# Patient Record
Sex: FEMALE | Race: WHITE | NOT HISPANIC OR LATINO | Employment: OTHER | URBAN - METROPOLITAN AREA
[De-identification: names, ages, dates, MRNs, and addresses within clinical notes are randomized per-mention and may not be internally consistent; named-entity substitution may affect disease eponyms.]

---

## 2017-05-15 ENCOUNTER — HISTORICAL (OUTPATIENT)
Dept: INFUSION THERAPY | Facility: HOSPITAL | Age: 67
End: 2017-05-15

## 2017-09-18 ENCOUNTER — HISTORICAL (OUTPATIENT)
Dept: RADIOLOGY | Facility: HOSPITAL | Age: 67
End: 2017-09-18

## 2017-11-16 ENCOUNTER — HISTORICAL (OUTPATIENT)
Dept: INFUSION THERAPY | Facility: HOSPITAL | Age: 67
End: 2017-11-16

## 2018-01-05 ENCOUNTER — HISTORICAL (OUTPATIENT)
Dept: LAB | Facility: HOSPITAL | Age: 68
End: 2018-01-05

## 2018-05-24 ENCOUNTER — HISTORICAL (OUTPATIENT)
Dept: INFUSION THERAPY | Facility: HOSPITAL | Age: 68
End: 2018-05-24

## 2018-12-03 ENCOUNTER — HISTORICAL (OUTPATIENT)
Dept: INFUSION THERAPY | Facility: HOSPITAL | Age: 68
End: 2018-12-03

## 2019-01-31 ENCOUNTER — HISTORICAL (OUTPATIENT)
Dept: LAB | Facility: HOSPITAL | Age: 69
End: 2019-01-31

## 2019-06-19 ENCOUNTER — HISTORICAL (OUTPATIENT)
Dept: ADMINISTRATIVE | Facility: HOSPITAL | Age: 69
End: 2019-06-19

## 2019-06-19 LAB
ABS NEUT (OLG): 5.13 X10(3)/MCL (ref 2.1–9.2)
ALBUMIN SERPL-MCNC: 3.3 GM/DL (ref 3.4–5)
ALBUMIN/GLOB SERPL: 1 RATIO (ref 1.1–2)
ALP SERPL-CCNC: 70 UNIT/L (ref 38–126)
ALT SERPL-CCNC: 14 UNIT/L (ref 12–78)
AST SERPL-CCNC: 8 UNIT/L (ref 15–37)
BASOPHILS # BLD AUTO: 0 X10(3)/MCL (ref 0–0.2)
BASOPHILS NFR BLD AUTO: 0.4 %
BILIRUB SERPL-MCNC: 0.3 MG/DL (ref 0.2–1)
BILIRUBIN DIRECT+TOT PNL SERPL-MCNC: 0.1 MG/DL (ref 0–0.5)
BILIRUBIN DIRECT+TOT PNL SERPL-MCNC: 0.2 MG/DL (ref 0–0.8)
BUN SERPL-MCNC: 13 MG/DL (ref 7–18)
CALCIUM SERPL-MCNC: 9.5 MG/DL (ref 8.5–10.1)
CHLORIDE SERPL-SCNC: 105 MMOL/L (ref 98–107)
CO2 SERPL-SCNC: 29 MMOL/L (ref 21–32)
CREAT SERPL-MCNC: 0.83 MG/DL (ref 0.55–1.02)
EOSINOPHIL # BLD AUTO: 0.1 X10(3)/MCL (ref 0–0.9)
EOSINOPHIL NFR BLD AUTO: 1.3 %
ERYTHROCYTE [DISTWIDTH] IN BLOOD BY AUTOMATED COUNT: 14.5 % (ref 11.5–17)
FERRITIN SERPL-MCNC: 3.9 NG/ML (ref 8–388)
GLOBULIN SER-MCNC: 3.2 GM/DL (ref 2.4–3.5)
GLUCOSE SERPL-MCNC: 173 MG/DL (ref 74–106)
HCT VFR BLD AUTO: 34 % (ref 37–47)
HGB BLD-MCNC: 10 GM/DL (ref 12–16)
IRON SATN MFR SERPL: 8.6 % (ref 20–50)
IRON SERPL-MCNC: 36 MCG/DL (ref 50–175)
LYMPHOCYTES # BLD AUTO: 1.3 X10(3)/MCL (ref 0.6–4.6)
LYMPHOCYTES NFR BLD AUTO: 19 %
MCH RBC QN AUTO: 27.4 PG (ref 27–31)
MCHC RBC AUTO-ENTMCNC: 29.4 GM/DL (ref 33–36)
MCV RBC AUTO: 93.2 FL (ref 80–94)
MONOCYTES # BLD AUTO: 0.4 X10(3)/MCL (ref 0.1–1.3)
MONOCYTES NFR BLD AUTO: 5.1 %
NEUTROPHILS # BLD AUTO: 5.1 X10(3)/MCL (ref 2.1–9.2)
NEUTROPHILS NFR BLD AUTO: 74.2 %
PLATELET # BLD AUTO: 336 X10(3)/MCL (ref 130–400)
PMV BLD AUTO: 9.5 FL (ref 9.4–12.4)
POTASSIUM SERPL-SCNC: 3.9 MMOL/L (ref 3.5–5.1)
PROT SERPL-MCNC: 6.5 GM/DL (ref 6.4–8.2)
RBC # BLD AUTO: 3.65 X10(6)/MCL (ref 4.2–5.4)
SODIUM SERPL-SCNC: 140 MMOL/L (ref 136–145)
TIBC SERPL-MCNC: 421 MCG/DL (ref 250–450)
TRANSFERRIN SERPL-MCNC: 330 MG/DL (ref 200–360)
VIT B12 SERPL-MCNC: 1256 PG/ML (ref 193–986)
WBC # SPEC AUTO: 6.9 X10(3)/MCL (ref 4.5–11.5)

## 2019-06-25 ENCOUNTER — HISTORICAL (OUTPATIENT)
Dept: INFUSION THERAPY | Facility: HOSPITAL | Age: 69
End: 2019-06-25

## 2019-09-19 ENCOUNTER — HISTORICAL (OUTPATIENT)
Dept: RADIOLOGY | Facility: HOSPITAL | Age: 69
End: 2019-09-19

## 2019-10-10 ENCOUNTER — HISTORICAL (OUTPATIENT)
Dept: LAB | Facility: HOSPITAL | Age: 69
End: 2019-10-10

## 2019-10-10 LAB
ABS NEUT (OLG): 4.2 X10(3)/MCL (ref 2.1–9.2)
ALBUMIN SERPL-MCNC: 3.4 GM/DL (ref 3.4–5)
ALBUMIN/GLOB SERPL: 1 RATIO (ref 1.1–2)
ALP SERPL-CCNC: 56 UNIT/L (ref 38–126)
ALT SERPL-CCNC: 17 UNIT/L (ref 12–78)
AST SERPL-CCNC: 10 UNIT/L (ref 15–37)
BASOPHILS # BLD AUTO: 0 X10(3)/MCL (ref 0–0.2)
BASOPHILS NFR BLD AUTO: 1 %
BILIRUB SERPL-MCNC: 0.2 MG/DL (ref 0.2–1)
BILIRUBIN DIRECT+TOT PNL SERPL-MCNC: 0.1 MG/DL (ref 0–0.5)
BILIRUBIN DIRECT+TOT PNL SERPL-MCNC: 0.1 MG/DL (ref 0–0.8)
BUN SERPL-MCNC: 16 MG/DL (ref 7–18)
CALCIUM SERPL-MCNC: 9.6 MG/DL (ref 8.5–10.1)
CHLORIDE SERPL-SCNC: 106 MMOL/L (ref 98–107)
CHOLEST SERPL-MCNC: 210 MG/DL (ref 0–200)
CHOLEST/HDLC SERPL: 2.8 {RATIO} (ref 0–4)
CO2 SERPL-SCNC: 28 MMOL/L (ref 21–32)
CREAT SERPL-MCNC: 0.87 MG/DL (ref 0.55–1.02)
EOSINOPHIL # BLD AUTO: 0.1 X10(3)/MCL (ref 0–0.9)
EOSINOPHIL NFR BLD AUTO: 1 %
ERYTHROCYTE [DISTWIDTH] IN BLOOD BY AUTOMATED COUNT: 16.5 % (ref 11.5–17)
EST. AVERAGE GLUCOSE BLD GHB EST-MCNC: 146 MG/DL
GLOBULIN SER-MCNC: 3.4 GM/DL (ref 2.4–3.5)
GLUCOSE SERPL-MCNC: 140 MG/DL (ref 74–106)
HBA1C MFR BLD: 6.7 % (ref 4.2–6.3)
HCT VFR BLD AUTO: 36.9 % (ref 37–47)
HDLC SERPL-MCNC: 76 MG/DL (ref 35–60)
HGB BLD-MCNC: 10.8 GM/DL (ref 12–16)
LDLC SERPL CALC-MCNC: 109 MG/DL (ref 0–129)
LYMPHOCYTES # BLD AUTO: 1.4 X10(3)/MCL (ref 0.6–4.6)
LYMPHOCYTES NFR BLD AUTO: 23 %
MCH RBC QN AUTO: 27.2 PG (ref 27–31)
MCHC RBC AUTO-ENTMCNC: 29.3 GM/DL (ref 33–36)
MCV RBC AUTO: 92.9 FL (ref 80–94)
MONOCYTES # BLD AUTO: 0.4 X10(3)/MCL (ref 0.1–1.3)
MONOCYTES NFR BLD AUTO: 6 %
NEUTROPHILS # BLD AUTO: 4.2 X10(3)/MCL (ref 2.1–9.2)
NEUTROPHILS NFR BLD AUTO: 69 %
PLATELET # BLD AUTO: 364 X10(3)/MCL (ref 130–400)
PMV BLD AUTO: 10.4 FL (ref 9.4–12.4)
POTASSIUM SERPL-SCNC: 4.2 MMOL/L (ref 3.5–5.1)
PROT SERPL-MCNC: 6.8 GM/DL (ref 6.4–8.2)
RBC # BLD AUTO: 3.97 X10(6)/MCL (ref 4.2–5.4)
SODIUM SERPL-SCNC: 142 MMOL/L (ref 136–145)
TRIGL SERPL-MCNC: 127 MG/DL (ref 30–150)
VLDLC SERPL CALC-MCNC: 25 MG/DL
WBC # SPEC AUTO: 6.1 X10(3)/MCL (ref 4.5–11.5)

## 2019-12-30 ENCOUNTER — HISTORICAL (OUTPATIENT)
Dept: INFUSION THERAPY | Facility: HOSPITAL | Age: 69
End: 2019-12-30

## 2020-06-17 LAB — CRC RECOMMENDATION EXT: NORMAL

## 2021-01-11 ENCOUNTER — HISTORICAL (OUTPATIENT)
Dept: HEMATOLOGY/ONCOLOGY | Facility: CLINIC | Age: 71
End: 2021-01-11

## 2021-04-09 ENCOUNTER — HISTORICAL (OUTPATIENT)
Dept: ADMINISTRATIVE | Facility: HOSPITAL | Age: 71
End: 2021-04-09

## 2021-04-09 LAB
ABS NEUT (OLG): 3.01 X10(3)/MCL (ref 2.1–9.2)
ALBUMIN SERPL-MCNC: 3.7 GM/DL (ref 3.4–4.8)
ALBUMIN/GLOB SERPL: 1.4 RATIO (ref 1.1–2)
ALP SERPL-CCNC: 74 UNIT/L (ref 40–150)
ALT SERPL-CCNC: 13 UNIT/L (ref 0–55)
APPEARANCE, UA: CLEAR
AST SERPL-CCNC: 13 UNIT/L (ref 5–34)
BACTERIA SPEC CULT: ABNORMAL /HPF
BASOPHILS # BLD AUTO: 0 X10(3)/MCL (ref 0–0.2)
BASOPHILS NFR BLD AUTO: 1 %
BILIRUB SERPL-MCNC: 0.5 MG/DL
BILIRUB UR QL STRIP: NEGATIVE
BILIRUBIN DIRECT+TOT PNL SERPL-MCNC: 0.2 MG/DL (ref 0–0.5)
BILIRUBIN DIRECT+TOT PNL SERPL-MCNC: 0.3 MG/DL (ref 0–0.8)
BUN SERPL-MCNC: 16.3 MG/DL (ref 9.8–20.1)
CALCIUM SERPL-MCNC: 11.6 MG/DL (ref 8.4–10.2)
CHLORIDE SERPL-SCNC: 105 MMOL/L (ref 98–107)
CO2 SERPL-SCNC: 29 MMOL/L (ref 23–31)
COLOR UR: YELLOW
CREAT SERPL-MCNC: 0.89 MG/DL (ref 0.55–1.02)
EOSINOPHIL # BLD AUTO: 0.1 X10(3)/MCL (ref 0–0.9)
EOSINOPHIL NFR BLD AUTO: 2 %
ERYTHROCYTE [DISTWIDTH] IN BLOOD BY AUTOMATED COUNT: 13.1 % (ref 11.5–17)
EST. AVERAGE GLUCOSE BLD GHB EST-MCNC: 151.3 MG/DL
GLOBULIN SER-MCNC: 2.6 GM/DL (ref 2.4–3.5)
GLUCOSE (UA): NEGATIVE
GLUCOSE SERPL-MCNC: 125 MG/DL (ref 82–115)
HBA1C MFR BLD: 6.9 %
HCT VFR BLD AUTO: 40.8 % (ref 37–47)
HGB BLD-MCNC: 12.5 GM/DL (ref 12–16)
HGB UR QL STRIP: NEGATIVE
KETONES UR QL STRIP: NEGATIVE
LEUKOCYTE ESTERASE UR QL STRIP: ABNORMAL
LYMPHOCYTES # BLD AUTO: 1.5 X10(3)/MCL (ref 0.6–4.6)
LYMPHOCYTES NFR BLD AUTO: 30 %
MCH RBC QN AUTO: 31.7 PG (ref 27–31)
MCHC RBC AUTO-ENTMCNC: 30.6 GM/DL (ref 33–36)
MCV RBC AUTO: 103.6 FL (ref 80–94)
MONOCYTES # BLD AUTO: 0.4 X10(3)/MCL (ref 0.1–1.3)
MONOCYTES NFR BLD AUTO: 8 %
NEUTROPHILS # BLD AUTO: 3.01 X10(3)/MCL (ref 2.1–9.2)
NEUTROPHILS NFR BLD AUTO: 60 %
NITRITE UR QL STRIP: NEGATIVE
PH UR STRIP: 5 [PH] (ref 5–9)
PLATELET # BLD AUTO: 349 X10(3)/MCL (ref 130–400)
PMV BLD AUTO: 10.8 FL (ref 9.4–12.4)
POTASSIUM SERPL-SCNC: 4.4 MMOL/L (ref 3.5–5.1)
PROT SERPL-MCNC: 6.3 GM/DL (ref 5.8–7.6)
PROT UR QL STRIP: NEGATIVE
RBC # BLD AUTO: 3.94 X10(6)/MCL (ref 4.2–5.4)
RBC #/AREA URNS HPF: ABNORMAL /[HPF]
SODIUM SERPL-SCNC: 142 MMOL/L (ref 136–145)
SP GR UR STRIP: 1.03 (ref 1–1.03)
SQUAMOUS EPITHELIAL, UA: ABNORMAL /HPF (ref 0–4)
UROBILINOGEN UR STRIP-ACNC: 0.2
WBC # SPEC AUTO: 5.1 X10(3)/MCL (ref 4.5–11.5)
WBC #/AREA URNS HPF: ABNORMAL /[HPF]

## 2022-04-07 ENCOUNTER — HISTORICAL (OUTPATIENT)
Dept: ADMINISTRATIVE | Facility: HOSPITAL | Age: 72
End: 2022-04-07
Payer: MEDICARE

## 2022-04-24 VITALS
BODY MASS INDEX: 24.37 KG/M2 | HEIGHT: 65 IN | SYSTOLIC BLOOD PRESSURE: 122 MMHG | DIASTOLIC BLOOD PRESSURE: 80 MMHG | OXYGEN SATURATION: 96 % | WEIGHT: 146.25 LBS

## 2022-04-30 NOTE — PROGRESS NOTES
Patient:   Filomena Tam             MRN: [id number]            FIN: [id number]               Age:   68 years     Sex:  Female     :  1950   Associated Diagnoses:   Ductal carcinoma in situ (DCIS) of right breast; Aromatase inhibitor use; Osteopenia; Atypical ductal hyperplasia, breast   Author:   Ruthie Dominguez NP      Visit Information   Diagnosis:     1) Right DCIS, ()--->    -DCIS Recurrence ()  2) Atypical ductal hyperplasia  3) Intraductal papilloma ()    Present Treatment:   Arimidex 1 mg daily on 2015  Prolia (initiated 10/2015)    Treatment history:      Lumpectomy (9/21/10) --> XRT (Mammosite) --> Tamoxifen (-2014)-->Recurrence while on Tamoxifen--> Lumpectomy--> + margins---> re excision-->whole breast XRT--> Femara-->Arimidex    Mrs TAM, she is a  68 years old female that back in 2009 she had a normal mammogram that showed a focal asymmetry within the retroareolar region of the left breast. She was called for additional views that demonstrated multiple circular masses which contain lucency within them associated with pseudocapsule. Suspected  hamartomatous changes (benign). There were concerned that dose nodules could be fibroadenoma versus phyllodes tumor. She had an ultrasound-guided, large core, vacuum assisted needle biopsy of the left breast nodules. Pathology report dated 09 showed benign breast parenchyma with cysts, dilated ducts, ductal chronic inflammation, apocrine metaplasia, stromal fibrosis. The second biopsy showed focal moderate to florid ductal hyperplasia without atypia. A 6 month follow up mammography and ultrasound of the left breast was suggested to assure stability of these findings. She had this radiologic studies performed in , mammogram showed  post biopsy changes on the left with otherwise no significant changes and ultrasound showed no masses or fluid collection. She had her mammogram in July of this year  and the left breast showed benign appearing nodules and post biopsy change is. Right breast showed an enlarging nodule in the retroareolar area that may short 9 mm. It was not associated with calcification and a true lateral view Spot compression views and ultrasound were recommended. The spot compression and detailed images demonstrated that the nodule where real and that the margins were lobulated and irregular. The nodule was suspicious for malignancy at that point. Ultrasound of the right breast performed 8/10/10 confirmed the suspicious nodule, 3 cm from the nipple, with greatest dimension of 9 mm. She had a stereotactic vacuum assisted biopsy of the right breast on 8/26/10 that showed atypical ductal hyperplasia. Microcalcifications were present and associated with benign parenchyma. Cystic duct with papillary apocrine metaplasia. No invasive carcinoma identified. She had mammography guided wire localization of the right breast and lumpectomy on 9/21/10. Pathology report came back as ductal carcinoma in situ, solitary focus, 2 mm, solid type. Intermediate nuclear grade, deep lateral margins is 0.1 mm from the neoplasm. Ductal hyperplasia with atypia, multiple foci, clear margins. Sclerosing adenosis. Tissue studies showed ER negative, NH 1%. She had a bilateral breast MRI performed 10/4/10 and these showed bilateral breast cysts. No direct evidence of malignancy at that time. She had re excision on 11/2/10 of the right breast tissue and left breast tissue excision does showed focal intraductal papilloma. And she was referred for further evaluation and treatment recommendations.    She was started on Tamoxifen despite ER negative, but she was progesterone positive, mainly because of her extensive ADH as chemoprevention. She had XRT after her lumpectomy in [facility], [facility].      She has another breast imaging in June of this 2014 and it was abnormal. She has a right lumpectomy on 7/1/14 but margins were  involved with intraductal carcinoma. Dr Cates went back again 7/18/14 and she had clear margins and no residual disease. This time she was ER/VT+. She has some thickening of uterus. I reviewed results and  referred her back to her GYN for poss. biopsy. She has the biopsy and it was negative.  She was started on aromatase inhibitor after completion of her whole breast radiation.     Bone density on 9/18/17 with osteopenia.          Chief Complaint      Interval History        Patient presents today for 6 month f/u in surveillance for DCIS dx in 2010 and recurrence in 2014 . Patient was switched from Femara to Arimidex in Feb 2015 and is tolerating fairly well. She has chronic bone & joint pain, not worsening.    She had a bilateral mammogram on 12/27/2018 that demonstrated no mammographic evidence for breast malignancy. No interval change in appearance. Assessment result code BI-RADS 2 benign findings bilateral with recommendations for normal screening bilateral in one year.  She has no new complaints today.  No hot flashes, no fever chills or sweats. Denies N/V/C/D. She has chronic anemia but her H& H is below her baseline today. It is 10/34. She deneis any evidence of bleeding. She is due for colonoscopy next year.       Review of Systems   Constitutional:  No fever, No chills, No sweats, No weakness, No fatigue, No night sweats, No weight loss.    Eye:  No icterus, No blurring, No double vision, No visual disturbances.    Ear/Nose/Mouth/Throat:  No epistaxis, No nasal congestion, No nasal discharge, No oral lesion, No sore throat, No tinnitus.    Respiratory:  No shortness of breath, No cough, No sputum production, No hemoptysis, No wheezing, No exertional dyspnea.    Cardiovascular:  No chest pain, No palpitations, No peripheral edema.    Breast:  No lump/ mass, No nipple inversion, No nipple discharge, No redness, No peau d' orange.    Gastrointestinal:  Heartburn, Abdominal pain, No nausea, No vomiting, No  diarrhea, No constipation, No hematemesis.    Genitourinary:  No dysuria, No hematuria, No change in urine stream, No urinary frequency.    Hematology/Lymphatics:  No bleeding tendency, No bruise, No swollen lymph glands.    Endocrine:  No excessive thirst, No polyuria, No cold intolerance, No heat intolerance, No flushing.    Immunologic:  Negative.    Musculoskeletal:  Back pain, Joint pain, Muscle pain.    Integumentary:  No rash, No pruritus, No skin lesion.    Neurologic:  Negative.    Psychiatric:  Negative.    All other systems are negative      Health Status   Allergies:    Allergic Reactions (All)  No Known Allergies,    Allergies (1) Active Reaction  No Known Allergies None Documented   Current medications:  (Selected)   Outpatient Medications  Future  Prolia 60 mg/mL subcutaneous solution: 60 mg, form: Soln, Subcutaneous, Once-NOW, first dose 05/28/19 14:00:00 CDT, stop date 05/28/19 14:00:00 CDT, Routine, Days 185, Future Order  Prescriptions  Prescribed  Arimidex 1 mg oral tablet: 1 mg = 1 tab(s), Oral, Daily, # 90 tab(s), 1 Refill(s), Pharmacy: [facility] [id number]  Augmentin 875 mg-125 mg oral tablet: = 1 tab(s), Oral, BID, # 14 tab(s), 1 Refill(s), Pharmacy: [facility] [id number]  CONTOUR LANCETS: CONTOUR LANCETS, See Instructions, TEST GLUCOSE ONCE DAILY, # 100 box(es), 3 Refill(s), Pharmacy: [id number]  Cipro 500 mg oral tablet: 500 mg = 1 tab(s), Oral, BID, # 10 tab(s), 0 Refill(s), Pharmacy: [facility] [address]  Mar-cof CG 7.5 mg-225 mg/5 mL oral liquid: 5 mL, Oral, q6hr, PRN PRN for cough, # 140 mL, 1 Refill(s), Pharmacy: [facility]  citalopram 10 mg oral tablet: See Instructions, TAKE ONE TABLET BY MOUTH ONCE DAILY, # 90 tab(s), 3 Refill(s), Pharmacy: [facility] [address]  contour test strips: contour test strips, See Instructions, test glucose once daily, # 100 box(es), 3 Refill(s), Pharmacy: [id number]  metFORMIN 750 mg oral tablet, extended release: See  Instructions, TAKE TWO TABLETS BY MOUTH ONCE DAILY WITH A  MEAL, # 180 tab(s), 3 Refill(s), Pharmacy: [id number]  Documented Medications  Documented  Calcium and Magnesium oral tablet: 0 Refill(s)  LOSARTAN/HCT TAB 50-12.5: 1 tab(s), Oral, Daily  Nexium 40 mg oral delayed release capsule (pt. own): 0 Refill(s)  Prolia: 0 Refill(s)  Vitamin D with Minerals oral tablet: 0 Refill(s)  Vitamin D3 2000 intl units oral capsule: 2,000 IntUnit = 1 cap(s), Oral, Daily, 0 Refill(s)  atorvastatin 40 mg oral tablet: 0 Refill(s)  biotin 5000 mcg oral tablet, disintegratin,000 mcg = 1 tab(s), Oral, Daily, with meals, # 45 tab(s), 0 Refill(s)   Problem list:    All Problems  Glaucoma / SNOMED CT 21960375 / Confirmed  Hypercholesteremia / SNOMED CT 63050872 / Confirmed  Hypothyroid / SNOMED CT 65585477 / Confirmed  Ductal carcinoma in situ (DCIS) of right breast / SNOMED CT 066955412 / Confirmed  Kidney stone / SNOMED CT 605114796 / Confirmed  Multinodular goiter / SNOMED CT 199983454 / Confirmed  Osteopenia / SNOMED CT 807290417 / Confirmed  Colon polyp / SNOMED CT 428269285 / Confirmed  Lumbar herniated disc / SNOMED CT 534189136 / Confirmed  Review of care plan / SNOMED CT 3533187441 / Confirmed  Sleep apnea / SNOMED CT 980911386 / Confirmed  Diabetes mellitus, type 2 / SNOMED CT 744475217 / Confirmed  Vitamin D deficiency / SNOMED CT 29642744 / Confirmed  Canceled: Atypical ductal hyperplasia, breast / SNOMED CT 6229341058  Canceled: Back pain / SNOMED CT 8618869038  Canceled: Breast cancer / SNOMED CT 570586751  Canceled: Diabetes mellitus / SNOMED CT 344840332  Canceled: Hyperlipidemia / ICD-9-.4  Canceled: Kidney disease / SNOMED CT [id number]  Canceled: Pain in shoulder / SNOMED CT 9725ZSDA-[id number]  R shoulder-bursitis  Canceled: Thyroid disease / SNOMED CT [id number],    Active Problems (13)  Colon polyp   Diabetes mellitus, type 2    Ductal carcinoma in situ (DCIS) of right breast   Glaucoma   Hypercholesteremia   Hypothyroid   Kidney stone   Lumbar herniated disc   Multinodular goiter   Osteopenia   Review of care plan   Sleep apnea   Vitamin D deficiency       Histories   Past Medical History:    No active or resolved past medical history items have been selected or recorded.   Family History:    Hypertension  Mother  Diabetes mellitus type 2  Mother  Father ()  Brother (Radhika Augustin)  CAD (coronary artery disease)....  Father ()  Polyp colon  Father ()  Hypertension.  Father ()  Mother     Procedure history:    Right Breast lumpectomy/radiation on 2014 at 63 Years.  Cholecystectomy; (04740) on 2003 at 52 Years.  sinus surgery on 1995 at 44 Years.  D & C x2.  Comments:  2014 11:53 - Sean Bowen LPN   and    Social History        Social & Psychosocial Habits    Alcohol  2014 Risk Assessment: Denies Alcohol Use    08/15/2018  Use: Current    Frequency: 1-2 times per year    Employment/School  2014  Status: Employed    Description: office-, payroll    Home/Environment  2014  Lives with: Spouse    Comment: pt is  - 2014 15:16 - Sean Bowen LPN    Substance Use  2014 Risk Assessment: Denies Substance Abuse    Tobacco  2014 Risk Assessment: Denies Tobacco Use    10/30/2015  Use: Unknown if ever smoked    2017  Use: Never smoker    2018  Use: Never smoker    Patient Wants Consult For Cessation Counseling N/A    2019  Use: Former smoker, quit more    Patient Wants Consult For Cessation Counseling No    03/15/2019  Use: Never (less than 100 in l    Patient Wants Consult For Cessation Counseling N/A.        Physical Examination   Vital Signs   2019 14:25 CDT      Temperature Oral          37.3 DegC                             Temperature Oral (calculated)             99.14 DegF                              Peripheral Pulse Rate     90 bpm                             Systolic Blood Pressure   132 mmHg                             Diastolic Blood Pressure  68 mmHg     General:  Alert and oriented, No acute distress.    Eye:  Pupils are equal, round and reactive to light, Extraocular movements are intact, Normal conjunctiva, Vision unchanged.         Sclera: Not icteric.    HENT:  Normocephalic, Oral mucosa is moist, No pharyngeal erythema, No sinus tenderness.    Neck:  Supple, No jugular venous distention, No lymphadenopathy.    Respiratory:  Lungs are clear to auscultation, Respirations are non-labored.    Cardiovascular:  Normal rate, Regular rhythm, No murmur, No gallop, No edema.    Breast:  No discharge, Stable Right breast 3 cm subareola mass that has been read as a seroma on previous mammograms including the most recent one..         Surgically altered: Right breast.         Scar(s): Right, Lumpectomy.    Gastrointestinal:  Soft, Non-tender, Non-distended, Normal bowel sounds, No organomegaly.    Genitourinary:  No costovertebral angle tenderness, No inguinal tenderness.    Lymphatics:  No lymphadenopathy neck, axilla, groin.    Integumentary:  Warm, Dry.    Neurologic:  Alert, Oriented, No focal deficits, Cranial Nerves II-XII are grossly intact.    Cognition and Speech:  Oriented, Speech clear and coherent, Functional cognition intact.    Psychiatric:  Cooperative, Appropriate mood & affect, Normal judgment.    ECOG Performance Scale: 0 - Fully active; no performance restrictions.      Review / Management   Results review:  Lab results   6/19/2019 13:34 CDT      WBC                       6.9 x10(3)/mcL                             RBC                       3.65 x10(6)/mcL  LOW                             Hgb                       10.0 gm/dL  LOW                             Hct                       34.0 %  LOW                             Platelet                  336 x10(3)/mcL                             MCV                        93.2 fL                             MCH                       27.4 pg                             MCHC                      29.4 gm/dL  LOW                             RDW                       14.5 %                             MPV                       9.5 fL                             Abs Neut                  5.13 x10(3)/mcL                             NEUT%                     74.2 %  NA                             NEUT#                     5.1 x10(3)/mcL                             LYMPH%                    19.0 %  NA                             LYMPH#                    1.3 x10(3)/mcL                             MONO%                     5.1 %  NA                             MONO#                     0.4 x10(3)/mcL                             EOS%                      1.3 %  NA                             EOS#                      0.1 x10(3)/mcL                             BASO%                     0.4 %  NA                             BASO#                     0.0 x10(3)/mcL  , Last 10 Days Lab Results : Lab View   6/19/2019 13:32 CDT      Sodium Lvl                140 mmol/L                             Potassium Lvl             3.9 mmol/L                             Chloride                  105 mmol/L                             CO2                       29.0 mmol/L                             Calcium Lvl               9.5 mg/dL                             Glucose Lvl               173 mg/dL  HI                             BUN                       13.0 mg/dL                             Creatinine                0.83 mg/dL                             eGFR-AA                   >60 mL/min/1.73 m2  NA                             eGFR-NO                  >60 mL/min/1.73 m2  NA                             Bili Total                0.3 mg/dL                             Bili Direct               0.10 mg/dL                             Bili Indirect             0.20 mg/dL                              AST                       8 unit/L  LOW                             ALT                       14 unit/L                             Alk Phos                  70 unit/L                             Total Protein             6.5 gm/dL                             Albumin Lvl               3.30 gm/dL  LOW                             Globulin                  3.20 gm/dL                             A/G Ratio                 1.0 ratio  LOW  .      Bilateral Diagnostic Mammogram (6/21/16):  No mammographic evidence of malignancy- BIRADS 2- Recommend 6 month diagnostic right mammography.   Bilateral Diagnostic Mammogram (12/17/15): No mammographic evidence of malignancy- BIRADS 2- Recommend 6 month diagnostic right mammography.    Laboratory Results      Impression and Plan   Diagnosis     Ductal carcinoma in situ (DCIS) of right breast (RIJ48-GO D05.11).     Aromatase inhibitor use (EIW29-YO Z79.811).     Osteopenia (QYT15-BD M85.80).     Atypical ductal hyperplasia, breast (NYI93-WT N60.99).       IMPRESSION:    CA IN SITU BREAST  BREAST ANOMALIES OT-ADH  Osteopenia  long term use of aromatase inhibitors  ER positive    PLAN:    1) DCIS 2010: Patient with h/o of DCIS of her right breast, diagnosed in 2010. She has lumpectomy followed by mammo site and then she was initiated on endocrine therapy with Tamoxifen.     -Recurrence 2014: She developed DCIS on the ipsilateral breast while on Tamoxifen in 2014. She underwent lumpectomy and later on, Re excision due to positive margins. She completed whole breast radiation with Dr Iniguez.  ER was positive so she was started on Femara but she did not tolerate well, therefore, she was switched to Arimidex and tolerates it fairly well.   She was instructed to continue Arimidex for a total of 5 year of endocrine therapy--Sept 2019.    2.) Anemia- chronic in nature but lower than baseline today. Ordered for iron profile, ferritin, vitamin b12 level and folate level- will contact  patient with results if intervention is recommended.    3) Osteopenia: Last received Prolia on 12/3/18. Was due in 5/2019. She did not receive an appt. Will rescheduled to tomorrow, 6/20/19.  Next Bone Density due 09/2019-ordered        Continue Arimidex 1mg po qd until September 2019.  Mammogram- due Dec 2019 at [facility]--Dr. Cates orders her mammograms...next due in 12/2019.   RTC  6 months or earlier if needed with a CBC, CMP.    Instructed to call for any questions or problems.      LEONOR Alcocer

## 2022-08-15 ENCOUNTER — DOCUMENTATION ONLY (OUTPATIENT)
Dept: ADMINISTRATIVE | Facility: HOSPITAL | Age: 72
End: 2022-08-15
Payer: MEDICARE

## 2022-09-07 ENCOUNTER — OFFICE VISIT (OUTPATIENT)
Dept: HEMATOLOGY/ONCOLOGY | Facility: CLINIC | Age: 72
End: 2022-09-07
Payer: MEDICARE

## 2022-09-07 VITALS
HEART RATE: 78 BPM | DIASTOLIC BLOOD PRESSURE: 82 MMHG | BODY MASS INDEX: 27.31 KG/M2 | SYSTOLIC BLOOD PRESSURE: 137 MMHG | WEIGHT: 160 LBS | HEIGHT: 64 IN | TEMPERATURE: 98 F | OXYGEN SATURATION: 96 %

## 2022-09-07 DIAGNOSIS — M85.80 OSTEOPENIA, UNSPECIFIED LOCATION: ICD-10-CM

## 2022-09-07 DIAGNOSIS — D05.11 INTRADUCTAL CARCINOMA IN SITU OF RIGHT BREAST: Primary | ICD-10-CM

## 2022-09-07 DIAGNOSIS — N60.99 ATYPICAL DUCTAL HYPERPLASIA OF BREAST: ICD-10-CM

## 2022-09-07 PROCEDURE — 99999 PR PBB SHADOW E&M-EST. PATIENT-LVL III: CPT | Mod: PBBFAC,,, | Performed by: NURSE PRACTITIONER

## 2022-09-07 PROCEDURE — 99213 PR OFFICE/OUTPT VISIT, EST, LEVL III, 20-29 MIN: ICD-10-PCS | Mod: S$PBB,,, | Performed by: NURSE PRACTITIONER

## 2022-09-07 PROCEDURE — 99213 OFFICE O/P EST LOW 20 MIN: CPT | Mod: S$PBB,,, | Performed by: NURSE PRACTITIONER

## 2022-09-07 PROCEDURE — 99213 OFFICE O/P EST LOW 20 MIN: CPT | Mod: PBBFAC | Performed by: NURSE PRACTITIONER

## 2022-09-07 PROCEDURE — 99999 PR PBB SHADOW E&M-EST. PATIENT-LVL III: ICD-10-PCS | Mod: PBBFAC,,, | Performed by: NURSE PRACTITIONER

## 2022-09-07 RX ORDER — GABAPENTIN 300 MG/1
300 CAPSULE ORAL 3 TIMES DAILY
COMMUNITY
Start: 2022-09-06

## 2022-09-07 RX ORDER — CITALOPRAM 20 MG/1
20 TABLET, FILM COATED ORAL DAILY
COMMUNITY
Start: 2022-07-05

## 2022-09-07 RX ORDER — LOSARTAN POTASSIUM AND HYDROCHLOROTHIAZIDE 12.5; 5 MG/1; MG/1
1 TABLET ORAL DAILY
COMMUNITY
Start: 2022-07-11

## 2022-09-07 RX ORDER — PANTOPRAZOLE SODIUM 40 MG/1
40 TABLET, DELAYED RELEASE ORAL DAILY
COMMUNITY
Start: 2022-07-11

## 2022-09-07 RX ORDER — METFORMIN HYDROCHLORIDE 750 MG/1
1500 TABLET, EXTENDED RELEASE ORAL 2 TIMES DAILY WITH MEALS
COMMUNITY
Start: 2022-07-15

## 2022-09-07 RX ORDER — ATORVASTATIN CALCIUM 40 MG/1
40 TABLET, FILM COATED ORAL DAILY
COMMUNITY
Start: 2022-07-14

## 2022-09-07 NOTE — PROGRESS NOTES
Heme/Onc Progress Note    PATIENT: Filomena Tam  MRN: [id number]  DATE: 9/7/2022  Chief Complaint: No Concerns today        Oncology History   Diagnosis:     1) Right DCIS, (2010)--->    -DCIS Recurrence (6/14)  2) Atypical ductal hyperplasia  3) Intraductal papilloma (2010)    Present Treatment:   Observation    Treatment history:      Lumpectomy (9/21/10) --> XRT (Mammosite) --> Tamoxifen (2010-6/2014)-->Recurrence while on Tamoxifen--> Lumpectomy--> + margins---> re excision-->whole breast XRT--> Femara-->Arimidex  Arimidex 1 mg daily on 02/02/2015  Prolia (initiated 10/2015)    Clinical History:  Mrs TAM, she is a  white female that back in April of 2009 she had a normal mammogram that showed a focal asymmetry within the retroareolar region of the left breast. She was called for additional views that demonstrated multiple circular masses which contain lucency within them associated with pseudocapsule. Suspected  hamartomatous changes (benign). There were concerned that dose nodules could be fibroadenoma versus phyllodes tumor. She had an ultrasound-guided, large core, vacuum assisted needle biopsy of the left breast nodules. Pathology report dated 4/13/09 showed benign breast parenchyma with cysts, dilated ducts, ductal chronic inflammation, apocrine metaplasia, stromal fibrosis. The second biopsy showed focal moderate to florid ductal hyperplasia without atypia. A 6 month follow up mammography and ultrasound of the left breast was suggested to assure stability of these findings. She had this radiologic studies performed in November of 09, mammogram showed  post biopsy changes on the left with otherwise no significant changes and ultrasound showed no masses or fluid collection. She had her mammogram in July of this year and the left breast showed benign appearing nodules and post biopsy change is. Right breast showed an enlarging nodule in the retroareolar area that may short 9 mm. It was not  associated with calcification and a true lateral view Spot compression views and ultrasound were recommended. The spot compression and detailed images demonstrated that the nodule where real and that the margins were lobulated and irregular. The nodule was suspicious for malignancy at that point. Ultrasound of the right breast performed 8/10/10 confirmed the suspicious nodule, 3 cm from the nipple, with greatest dimension of 9 mm. She had a stereotactic vacuum assisted biopsy of the right breast on 8/26/10 that showed atypical ductal hyperplasia. Microcalcifications were present and associated with benign parenchyma. Cystic duct with papillary apocrine metaplasia. No invasive carcinoma identified. She had mammography guided wire localization of the right breast and lumpectomy on 9/21/10. Pathology report came back as ductal carcinoma in situ, solitary focus, 2 mm, solid type. Intermediate nuclear grade, deep lateral margins is 0.1 mm from the neoplasm. Ductal hyperplasia with atypia, multiple foci, clear margins. Sclerosing adenosis. Tissue studies showed ER negative, CT 1%. She had a bilateral breast MRI performed 10/4/10 and these showed bilateral breast cysts. No direct evidence of malignancy at that time. She had re excision on 11/2/10 of the right breast tissue and left breast tissue excision does showed focal intraductal papilloma. And she was referred for further evaluation and treatment recommendations.    She was started on Tamoxifen despite ER negative, but she was progesterone positive, mainly because of her extensive ADH as chemoprevention. She had XRT after her lumpectomy in [facility], Mammosite.      She has another breast imaging in June of this 2014 and it was abnormal. She has a right lumpectomy on 7/1/14 but margins were involved with intraductal carcinoma. Dr Cates went back again 7/18/14 and she had clear margins and no residual disease. This time she was ER/CT+. She has some thickening of  uterus. I reviewed results and  referred her back to her GYN for poss. biopsy. She has the biopsy and it was negative.  She was started on aromatase inhibitor after completion of her whole breast radiation.     Bone density on 9/18/17 with osteopenia.         Past Medical History:   Diagnosis Date    Atypical ductal hyperplasia, breast     Ductal carcinoma in situ (DCIS) of right breast     Osteopenia            Review of Systems    Interval History, 9/7/22:  Patient presents today for 12 month f/u in surveillance for DCIS dx in 2010 and recurrence in 2014. Completed 5 years of endocrine therapy in Sep 2019.  She says that she feels well. Patient has no complaints today. No hot flashes, no fever chills or sweats. Denies nausea, vomiting, or diarrhea.     Mammogram done 12/22/2021, ordered by Dr. Cates, was benign.   Objective:     Vitals:    09/07/22 0913   BP: 137/82   Pulse: 78   Temp: 98.1 °F (36.7 °C)         Physical Exam  Vitals reviewed.   Constitutional:       Appearance: Normal appearance.   HENT:      Head: Normocephalic and atraumatic.      Right Ear: External ear normal.      Left Ear: External ear normal.      Nose: Nose normal.      Mouth/Throat:      Mouth: Mucous membranes are moist.      Pharynx: Oropharynx is clear.   Eyes:      General: Lids are normal. Lids are everted, no foreign bodies appreciated. Vision grossly intact. Gaze aligned appropriately.   Cardiovascular:      Rate and Rhythm: Normal rate and regular rhythm.      Pulses: Normal pulses.      Heart sounds: Normal heart sounds.   Pulmonary:      Effort: Pulmonary effort is normal. No accessory muscle usage or respiratory distress.      Breath sounds: Normal breath sounds. No decreased breath sounds, wheezing, rhonchi or rales.   Chest:   Breasts:     Right: No inverted nipple, mass, nipple discharge or tenderness.      Left: Normal. No inverted nipple, mass, nipple discharge or tenderness.      Comments: RIGHT BREAST LUMPECTOMY    Abdominal:      General: Bowel sounds are normal.      Palpations: Abdomen is soft.      Tenderness: There is no abdominal tenderness.   Musculoskeletal:         General: Normal range of motion.      Cervical back: Normal range of motion and neck supple.      Right lower leg: No edema.      Left lower leg: No edema.   Feet:      Right foot:      Skin integrity: Skin integrity normal.      Left foot:      Skin integrity: Skin integrity normal.   Lymphadenopathy:      Cervical: No cervical adenopathy.      Upper Body:      Right upper body: No supraclavicular, axillary or pectoral adenopathy.      Left upper body: No supraclavicular, axillary or pectoral adenopathy.      Lower Body: No right inguinal adenopathy. No left inguinal adenopathy.   Skin:     General: Skin is warm and dry.      Capillary Refill: Capillary refill takes less than 2 seconds.      Findings: No rash.      Nails: There is no clubbing.   Neurological:      General: No focal deficit present.      Mental Status: She is alert and oriented to person, place, and time. Mental status is at baseline.      Deep Tendon Reflexes: Reflexes normal.   Psychiatric:         Attention and Perception: Attention and perception normal.         Mood and Affect: Mood normal.         Behavior: Behavior normal.       Assessment:     1) DCIS 2010: Patient with h/o of DCIS of her right breast, diagnosed in 2010. She has lumpectomy followed by mammo site and then she was initiated on endocrine therapy with Tamoxifen.     -Recurrence 2014: She developed DCIS on the ipsilateral breast while on Tamoxifen in 2014. She underwent lumpectomy and later on, Re excision due to positive margins. She completed whole breast radiation with Dr Iniguez.  ER was positive so she was started on Femara but she did not tolerate well, therefore, she was switched to Arimidex and tolerates it fairly well.   Completed Arimidex for a total of 5 years- Sept 2019.  BREAST ANOMALIES  OT-ADH  Osteopenia--Last received Prolia on 12/3/18.  Bone density 9/19/2019 with osteopenia    ER positive    Problem List Items Addressed This Visit          Oncology    Intraductal carcinoma in situ of right breast - Primary    Atypical ductal hyperplasia of breast     Other Visit Diagnoses       Osteopenia, unspecified location                  Plan:        No clinical evidence of recurrence at this time.   PCP will forward blood work   Next mammogram is due in 12/2022- ordered by Dr. Cates.     RTC in 12 months for breast exam or earlier if needed. Goal is to have breasts physical exam by physician every 6 months.  She continues follow-up with Dr. Cates.    LEONOR Alcocer

## 2023-01-11 ENCOUNTER — HOSPITAL ENCOUNTER (EMERGENCY)
Facility: HOSPITAL | Age: 73
Discharge: HOME | End: 2023-01-11
Attending: STUDENT IN AN ORGANIZED HEALTH CARE EDUCATION/TRAINING PROGRAM
Payer: MEDICARE

## 2023-01-11 VITALS
DIASTOLIC BLOOD PRESSURE: 66 MMHG | HEIGHT: 64 IN | BODY MASS INDEX: 27.83 KG/M2 | SYSTOLIC BLOOD PRESSURE: 114 MMHG | TEMPERATURE: 98 F | HEART RATE: 83 BPM | OXYGEN SATURATION: 95 % | RESPIRATION RATE: 16 BRPM | WEIGHT: 163 LBS

## 2023-01-11 DIAGNOSIS — R53.1 GENERALIZED WEAKNESS: ICD-10-CM

## 2023-01-11 DIAGNOSIS — E86.0 DEHYDRATION: ICD-10-CM

## 2023-01-11 DIAGNOSIS — R50.9 FEVER: ICD-10-CM

## 2023-01-11 DIAGNOSIS — U07.1 COVID-19: Primary | ICD-10-CM

## 2023-01-11 LAB
ALBUMIN SERPL-MCNC: 3.2 G/DL (ref 3.4–4.8)
ALBUMIN/GLOB SERPL: 0.9 RATIO (ref 1.1–2)
ALP SERPL-CCNC: 92 UNIT/L (ref 40–150)
ALT SERPL-CCNC: 14 UNIT/L (ref 0–55)
APPEARANCE UR: CLEAR
AST SERPL-CCNC: 16 UNIT/L (ref 5–34)
BACTERIA #/AREA URNS AUTO: NORMAL /HPF
BASOPHILS # BLD AUTO: 0.03 X10(3)/MCL (ref 0–0.2)
BASOPHILS NFR BLD AUTO: 0.4 %
BILIRUB UR QL STRIP.AUTO: NEGATIVE MG/DL
BILIRUBIN DIRECT+TOT PNL SERPL-MCNC: 0.3 MG/DL
BNP BLD-MCNC: 92.1 PG/ML
BUN SERPL-MCNC: 18.1 MG/DL (ref 9.8–20.1)
CALCIUM SERPL-MCNC: 9.5 MG/DL (ref 8.4–10.2)
CHLORIDE SERPL-SCNC: 106 MMOL/L (ref 98–107)
CO2 SERPL-SCNC: 23 MMOL/L (ref 23–31)
COLOR UR AUTO: YELLOW
CREAT SERPL-MCNC: 1.17 MG/DL (ref 0.55–1.02)
EOSINOPHIL # BLD AUTO: 0.03 X10(3)/MCL (ref 0–0.9)
EOSINOPHIL NFR BLD AUTO: 0.4 %
ERYTHROCYTE [DISTWIDTH] IN BLOOD BY AUTOMATED COUNT: 13.7 % (ref 11.5–17)
FLUAV AG UPPER RESP QL IA.RAPID: NOT DETECTED
FLUBV AG UPPER RESP QL IA.RAPID: NOT DETECTED
GFR SERPLBLD CREATININE-BSD FMLA CKD-EPI: 49 MLS/MIN/1.73/M2
GLOBULIN SER-MCNC: 3.7 GM/DL (ref 2.4–3.5)
GLUCOSE SERPL-MCNC: 158 MG/DL (ref 82–115)
GLUCOSE UR QL STRIP.AUTO: NEGATIVE MG/DL
HCT VFR BLD AUTO: 34.5 % (ref 37–47)
HGB BLD-MCNC: 10.9 GM/DL (ref 12–16)
IMM GRANULOCYTES # BLD AUTO: 0.03 X10(3)/MCL (ref 0–0.04)
IMM GRANULOCYTES NFR BLD AUTO: 0.4 %
KETONES UR QL STRIP.AUTO: NEGATIVE MG/DL
LACTATE SERPL-SCNC: 0.8 MMOL/L (ref 0.5–2.2)
LACTATE SERPL-SCNC: 2.3 MMOL/L (ref 0.5–2.2)
LEUKOCYTE ESTERASE UR QL STRIP.AUTO: NEGATIVE UNIT/L
LYMPHOCYTES # BLD AUTO: 0.38 X10(3)/MCL (ref 0.6–4.6)
LYMPHOCYTES NFR BLD AUTO: 4.8 %
MCH RBC QN AUTO: 30.4 PG
MCHC RBC AUTO-ENTMCNC: 31.6 MG/DL (ref 33–36)
MCV RBC AUTO: 96.4 FL (ref 80–94)
MONOCYTES # BLD AUTO: 0.63 X10(3)/MCL (ref 0.1–1.3)
MONOCYTES NFR BLD AUTO: 8 %
NEUTROPHILS # BLD AUTO: 6.75 X10(3)/MCL (ref 2.1–9.2)
NEUTROPHILS NFR BLD AUTO: 86 %
NITRITE UR QL STRIP.AUTO: NEGATIVE
NRBC BLD AUTO-RTO: 0 %
PH UR STRIP.AUTO: 5 [PH]
PLATELET # BLD AUTO: 243 X10(3)/MCL (ref 130–400)
PMV BLD AUTO: 10.7 FL (ref 7.4–10.4)
POTASSIUM SERPL-SCNC: 4.1 MMOL/L (ref 3.5–5.1)
PROT SERPL-MCNC: 6.9 GM/DL (ref 5.8–7.6)
PROT UR QL STRIP.AUTO: NEGATIVE MG/DL
RBC # BLD AUTO: 3.58 X10(6)/MCL (ref 4.2–5.4)
RBC #/AREA URNS AUTO: <5 /HPF
RBC UR QL AUTO: NEGATIVE UNIT/L
SARS-COV-2 RNA RESP QL NAA+PROBE: DETECTED
SODIUM SERPL-SCNC: 139 MMOL/L (ref 136–145)
SP GR UR STRIP.AUTO: 1.02 (ref 1–1.03)
SQUAMOUS #/AREA URNS AUTO: <5 /HPF
TROPONIN I SERPL-MCNC: <0.01 NG/ML (ref 0–0.04)
UROBILINOGEN UR STRIP-ACNC: 0.2 MG/DL
WBC # SPEC AUTO: 7.9 X10(3)/MCL (ref 4.5–11.5)
WBC #/AREA URNS AUTO: <5 /HPF

## 2023-01-11 PROCEDURE — 85025 COMPLETE CBC W/AUTO DIFF WBC: CPT | Performed by: STUDENT IN AN ORGANIZED HEALTH CARE EDUCATION/TRAINING PROGRAM

## 2023-01-11 PROCEDURE — 87040 BLOOD CULTURE FOR BACTERIA: CPT | Mod: 59 | Performed by: STUDENT IN AN ORGANIZED HEALTH CARE EDUCATION/TRAINING PROGRAM

## 2023-01-11 PROCEDURE — 96360 HYDRATION IV INFUSION INIT: CPT

## 2023-01-11 PROCEDURE — 83605 ASSAY OF LACTIC ACID: CPT | Performed by: STUDENT IN AN ORGANIZED HEALTH CARE EDUCATION/TRAINING PROGRAM

## 2023-01-11 PROCEDURE — 63600175 PHARM REV CODE 636 W HCPCS: Performed by: STUDENT IN AN ORGANIZED HEALTH CARE EDUCATION/TRAINING PROGRAM

## 2023-01-11 PROCEDURE — 80053 COMPREHEN METABOLIC PANEL: CPT | Performed by: STUDENT IN AN ORGANIZED HEALTH CARE EDUCATION/TRAINING PROGRAM

## 2023-01-11 PROCEDURE — 84484 ASSAY OF TROPONIN QUANT: CPT | Performed by: STUDENT IN AN ORGANIZED HEALTH CARE EDUCATION/TRAINING PROGRAM

## 2023-01-11 PROCEDURE — 99284 EMERGENCY DEPT VISIT MOD MDM: CPT | Mod: 25,CS

## 2023-01-11 PROCEDURE — 25000003 PHARM REV CODE 250: Performed by: STUDENT IN AN ORGANIZED HEALTH CARE EDUCATION/TRAINING PROGRAM

## 2023-01-11 PROCEDURE — 0240U COVID/FLU A&B PCR: CPT | Performed by: STUDENT IN AN ORGANIZED HEALTH CARE EDUCATION/TRAINING PROGRAM

## 2023-01-11 PROCEDURE — 81001 URINALYSIS AUTO W/SCOPE: CPT | Performed by: STUDENT IN AN ORGANIZED HEALTH CARE EDUCATION/TRAINING PROGRAM

## 2023-01-11 PROCEDURE — 83880 ASSAY OF NATRIURETIC PEPTIDE: CPT | Performed by: STUDENT IN AN ORGANIZED HEALTH CARE EDUCATION/TRAINING PROGRAM

## 2023-01-11 RX ORDER — ACETAMINOPHEN 500 MG
1000 TABLET ORAL
Status: COMPLETED | OUTPATIENT
Start: 2023-01-11 | End: 2023-01-11

## 2023-01-11 RX ADMIN — ACETAMINOPHEN 1000 MG: 500 TABLET ORAL at 12:01

## 2023-01-11 RX ADMIN — SODIUM CHLORIDE, POTASSIUM CHLORIDE, SODIUM LACTATE AND CALCIUM CHLORIDE 1000 ML: 600; 310; 30; 20 INJECTION, SOLUTION INTRAVENOUS at 01:01

## 2023-01-11 NOTE — ED PROVIDER NOTES
Encounter Date: 1/11/2023    SCRIBE #1 NOTE: I, Larry Montes De Oca, am scribing for, and in the presence of,  Larry Montes De Oca. I have scribed the following portions of the note - Other sections scribed: HPI, ROS, PE.     History     Chief Complaint   Patient presents with    Fatigue     Presents with c/o weakness. Onset yesterday     72 year old female presents to ED c/o generalized weakness onset 1430 yesterday. Pt reports that she was unable to get out of bed at 1430 and while straining to move she urinated on herself. Pt reports that later she had to crawl to get ff the cough. Pt reports additional symptoms of myalgias, fever, cough with clear sputum, throat redness, and right ear pain. Pt states that she went to her PCP for sinus pressure 2 weeks ago and was prescribed Augmentin which has not help relieve symptoms. Pt denies chest pain, abdominal pain, back pain, rash, and being on chemotherapy for breast cancer. Pt reports pmhx of back surgery and states that weakness is not similar to weakness before back surgery.     Per chart review, pt went to PCP for sinus pressure prescribed Augmentin and diagnosed with sinusitis.     The history is provided by the patient. No  was used.   Review of patient's allergies indicates:  No Known Allergies  Past Medical History:   Diagnosis Date    Atypical ductal hyperplasia, breast     Ductal carcinoma in situ (DCIS) of right breast     Osteopenia      Past Surgical History:   Procedure Laterality Date    BACK SURGERY      BREAST LUMPECTOMY Right 2014    CHOLECYSTECTOMY      COLONOSCOPY  06/17/2020    DILATION AND CURETTAGE OF UTERUS      SINUS SURGERY       Family History   Problem Relation Age of Onset    Hyperlipidemia Mother     Diabetes Mother     Hyperlipidemia Father     Diabetes Father     Coronary artery disease Father     Colon polyps Father     Diabetes Brother      Social History     Tobacco Use    Smoking status: Former     Types: Cigarettes     Smokeless tobacco: Never   Substance Use Topics    Alcohol use: Yes    Drug use: Never     Review of Systems   Constitutional:  Positive for fever. Negative for chills.   HENT:  Positive for ear pain. Negative for congestion, rhinorrhea and sore throat.    Eyes:  Negative for visual disturbance.   Respiratory:  Negative for cough and shortness of breath.    Cardiovascular:  Negative for chest pain and leg swelling.   Gastrointestinal:  Negative for abdominal pain, nausea and vomiting.   Genitourinary:  Negative for dysuria, hematuria, vaginal bleeding and vaginal discharge.   Musculoskeletal:  Positive for myalgias. Negative for joint swelling.   Skin:  Negative for rash.   Neurological:  Positive for weakness.   Psychiatric/Behavioral:  Negative for confusion.      Physical Exam     Initial Vitals [01/11/23 0051]   BP Pulse Resp Temp SpO2   (!) 143/76 (!) 119 20 (!) 102.8 °F (39.3 °C) 95 %      MAP       --         Physical Exam    Nursing note and vitals reviewed.  Constitutional: She is not diaphoretic. No distress.   HENT:   Head: Normocephalic and atraumatic.   Right Ear: Tympanic membrane normal.   Mouth/Throat: Mucous membranes are dry.   Eyes: EOM are normal. Pupils are equal, round, and reactive to light.   Neck: Neck supple.   Normal range of motion.  Cardiovascular:  Regular rhythm.   Tachycardia present.         No murmur heard.  Pulmonary/Chest: Breath sounds normal. No respiratory distress. She has no wheezes. She has no rales.   Abdominal: Abdomen is soft. She exhibits no distension. There is no abdominal tenderness.   Musculoskeletal:         General: Normal range of motion.      Cervical back: Normal range of motion and neck supple.     Neurological: She is alert and oriented to person, place, and time. She has normal strength.   Skin: Skin is warm. No rash noted.   Psychiatric: She has a normal mood and affect.       ED Course   Procedures  Labs Reviewed   COVID/FLU A&B PCR - Abnormal; Notable  for the following components:       Result Value    SARS-CoV-2 PCR Detected (*)     All other components within normal limits    Narrative:     The Xpert Xpress SARS-CoV-2/FLU/RSV plus is a rapid, multiplexed real-time PCR test intended for the simultaneous qualitative detection and differentiation of SARS-CoV-2, Influenza A, Influenza B, and respiratory syncytial virus (RSV) viral RNA in either nasopharyngeal swab or nasal swab specimens.         COMPREHENSIVE METABOLIC PANEL - Abnormal; Notable for the following components:    Glucose Level 158 (*)     Creatinine 1.17 (*)     Albumin Level 3.2 (*)     Globulin 3.7 (*)     Albumin/Globulin Ratio 0.9 (*)     All other components within normal limits   LACTIC ACID, PLASMA - Abnormal; Notable for the following components:    Lactic Acid Level 2.3 (*)     All other components within normal limits   CBC WITH DIFFERENTIAL - Abnormal; Notable for the following components:    RBC 3.58 (*)     Hgb 10.9 (*)     Hct 34.5 (*)     MCV 96.4 (*)     MCHC 31.6 (*)     MPV 10.7 (*)     Lymph # 0.38 (*)     All other components within normal limits   B-TYPE NATRIURETIC PEPTIDE - Normal   TROPONIN I - Normal   URINALYSIS, REFLEX TO URINE CULTURE - Normal   URINALYSIS, MICROSCOPIC - Normal   LACTIC ACID, PLASMA - Normal   BLOOD CULTURE [facility]   BLOOD CULTURE [facility]   CBC W/ AUTO DIFFERENTIAL    Narrative:     The following orders were created for panel order CBC auto differential.  Procedure                               Abnormality         Status                     ---------                               -----------         ------                     CBC with Differential[[id number]]        Abnormal            Final result                 Please view results for these tests on the individual orders.          Imaging Results              X-Ray Chest AP Portable (In process)                      Medications   acetaminophen tablet 1,000 mg (1,000 mg Oral Given 1/11/23 0055)   lactated ringers  bolus 1,000 mL (0 mLs Intravenous Stopped 1/11/23 0225)     Medical Decision Making:   History:   Old Medical Records: I decided to obtain old medical records.  Old Records Summarized: other records.       <> Summary of Records: Went to PCP for sinus pressure prescribed Augmentin and diagnosed with sinusitis. Pmhx of breast cancer.  Initial Assessment:   71 yo presenting with generalized weakness, cough  Febrile here, tachycardic  Well appearing on exam, does appear dehydrated  Will hydrate, obtain labs, urinalysis, covid/flu, reassess   Differential Diagnosis:   Judging by the patient's chief complaint and pertinent history, the patient has the following possible differential diagnoses, including but not limited to the following.  Some of these are deemed to be lower likelihood and some more likely based on my physical exam and history combined with possible lab work and/or imaging studies.   Please see the pertinent studies, and refer to the HPI.  Some of these diagnoses will take further evaluation to fully rule out, perhaps as an outpatient and the patient was encouraged to follow up when discharged for more comprehensive evaluation.    Generalized weakness: anemia, dehydration, electrolyte derangement, hypoglycemia, infection, intoxication, respiratory failure, toxic ingestion, ACS, thyroid disease, medications, psychiatric, autoimmune, rhabdomyolysis, myositis, peripheral neuropathy   Judging by the patient's chief complaint and pertinent history, the patient has the following possible differential diagnoses, including but not limited to the following.  Some of these are deemed to be lower likelihood and some more likely based on my physical exam and history combined with possible lab work and/or imaging studies.   Please see the pertinent studies, and refer to the HPI.  Some of these diagnoses will take further evaluation to fully rule out, perhaps as an outpatient and the patient was encouraged to follow up  when discharged for more comprehensive evaluation.    Viral syndrome, COVID, flu, otitis media UTI, intraabdominal infection, bacterial pharyngitis, pneumonia, sepsis, pyelonephritis, cellulitis, abscess, ENT infection, meningitis, encephalitis, PE, environmental exposure, medication side effect, drug use, NMS, serotonin syndrome, head injury      Independently Interpreted Test(s):   I have ordered and independently interpreted X-rays - see prior notes.  Clinical Tests:   Lab Tests: Ordered and Reviewed  Radiological Study: Ordered and Reviewed  ED Management:  IVF         Scribe Attestation:   Scribe #1: I performed the above scribed service and the documentation accurately describes the services I performed. I attest to the accuracy of the note.    Attending Attestation:           Physician Attestation for Scribe:  Physician Attestation Statement for Scribe #1: I, reviewed documentation, as scribed by Larry Montes De Oca in my presence, and it is both accurate and complete.       Medical Decision Making  Problems Addressed:  COVID-19: acute illness or injury that poses a threat to life or bodily functions  Dehydration: acute illness or injury that poses a threat to life or bodily functions  Fever: acute illness or injury that poses a threat to life or bodily functions  Generalized weakness: acute illness or injury that poses a threat to life or bodily functions      Amount and/or Complexity of Data Reviewed  Independent Historian: none (see above for summary)  External Data Reviewed: notes from clinic visits and prescription medications  (see above for summary)  Risk and benefits of testing: discussed   Labs: ordered and reviewed  Radiology: ordered and independent interpretation performed (see above)  Discussion of management or test interpretation with external provider(s): none    Risk  OTC medications  Parenteral medications  Prescription management     Critical Care  none            ED Course as of 01/11/23  0510   Wed Jan 11, 2023   0352 SARS-CoV2 (COVID-19) Qualitative PCR(!): Detected [AC]   0353 Improved HR and feels better after tylenol, IVF. Initial lactic mild elevation. COVID+. Will repeat lactic, ambulate and obtain O2 sat, reassess.  [AC]   0457 Repeat lactic normal. Ambulated around department, satting well. Feels better, amendable to discharge. WIll treat with paxlovid, discharge.  [AC]      ED Course User Index  [AC] Johnny Dennison IV, MD                   Clinical Impression:   Final diagnoses:  [R50.9] Fever  [U07.1] COVID-19 (Primary)  [R53.1] Generalized weakness  [E86.0] Dehydration        ED Disposition Condition    Discharge Stable          ED Prescriptions       Medication Sig Dispense Start Date End Date Auth. Provider    nirmatrelvir-ritonavir 300 mg (150 mg x 2)-100 mg copackaged tablets (EUA) Take 3 tablets by mouth 2 (two) times daily for 5 days. Each dose contains 2 nirmatrelvir (pink tablets) and 1 ritonavir (white tablet). Take all 3 tablets together 30 tablet 1/11/2023 1/16/2023 Johnny Dennison IV, MD          Follow-up Information       Follow up With Specialties Details Why Contact Info    [facility] - Emergency Dept Emergency Medicine Go to  If symptoms worsen [address] [place] [id number] [phone]    PCP  Schedule an appointment as soon as possible for a visit           I, Johnny Dennison MD personally performed the history, PE, MDM, and procedures as documented above and agree with the scribe's documentation.        Johnny Dennison IV, MD  01/11/23 0477

## 2023-01-11 NOTE — DISCHARGE INSTRUCTIONS
Thanks for letting use take care of you today! It is our goal to give you courteous care and to keep you comfortable and informed. If you have any questions before you leave I will be happy to try and answer them.     Advice after your visit:  Your visit in the emergency department is NOT definitive care - please follow-up with your primary care doctor and/or specialist within 1-2 days.  Please return if you have any worsening in your condition or if you have any other concerns.    Return to the emergency department if any worsening symptoms including fever, chest pain, difficulty breathing, weakness, numbness, tingling, nausea, vomiting, inability to eat, drink or take your medication, or any other new symptoms or concerns arise.      Please signup for MyChart as noted below in your paperwork to review all labwork, imaging results, and any other incidental findings from today's visit.     If you had radiology exams like an XRAY or CT in the emergency Department the interpreation on them may be preliminary - there may be less time sensitive findings on the reports please obtain these reports within 24 hours from the hospital or by using your out on your mobile phone to access records.  Bring these to your primary care doctor and/or specialist for further review of incidental findings.    Please review any LAB WORK from your visit today with your primary care physician.    If you were prescribed OPIATE PAIN MEDICATION - please understand of these medications can be addictive, you may fill less of the prescription was written for, you do not have to take the full prescription.  You may discard what you do not use.  Please seek help if you feel you are having problems with addiction.  Do not drive or operate heavy machinery if you are taking sedating medications.  Do not mix these medications with alcohol.      If you had a SPLINT placed in the emergency department if you have severe pain numbness tingling or  discoloration of year digits please remove the splint and return to the emergency department for further evaluation as this may represent a sign of compromise to the nerves or blood vessels due to swelling.    If you had SUTURES in the emergency department please have them removed in the prescribed time frame typically within 7-14 days.  You may shower but please do not bathe or swim.  Keep the wounds clean and dry and covered with a clean dressing.  Please return if he have any signs of infection like redness or drainage or pain at the suture site.    Please take the full course of  any ANTIBIOTICS you were prescribed - incomplete courses of antibiotics can cause resistance to antibiotics in the future which will make it difficult to treat any infections you may have.

## 2023-01-16 LAB
BACTERIA BLD CULT: NORMAL
BACTERIA BLD CULT: NORMAL

## 2023-09-13 ENCOUNTER — OFFICE VISIT (OUTPATIENT)
Dept: HEMATOLOGY/ONCOLOGY | Facility: CLINIC | Age: 73
End: 2023-09-13
Payer: MEDICARE

## 2023-09-13 VITALS
BODY MASS INDEX: 28.63 KG/M2 | SYSTOLIC BLOOD PRESSURE: 124 MMHG | TEMPERATURE: 97 F | DIASTOLIC BLOOD PRESSURE: 75 MMHG | WEIGHT: 166.81 LBS | RESPIRATION RATE: 18 BRPM | OXYGEN SATURATION: 97 % | HEART RATE: 75 BPM

## 2023-09-13 DIAGNOSIS — D05.11 INTRADUCTAL CARCINOMA IN SITU OF RIGHT BREAST: Primary | ICD-10-CM

## 2023-09-13 DIAGNOSIS — Z13.9 ENCOUNTER FOR SCREENING: ICD-10-CM

## 2023-09-13 DIAGNOSIS — N60.99 ATYPICAL DUCTAL HYPERPLASIA OF BREAST: ICD-10-CM

## 2023-09-13 DIAGNOSIS — Z12.31 ENCOUNTER FOR SCREENING MAMMOGRAM FOR MALIGNANT NEOPLASM OF BREAST: ICD-10-CM

## 2023-09-13 PROCEDURE — 99214 OFFICE O/P EST MOD 30 MIN: CPT | Mod: PBBFAC | Performed by: NURSE PRACTITIONER

## 2023-09-13 PROCEDURE — 99999 PR PBB SHADOW E&M-EST. PATIENT-LVL IV: CPT | Mod: PBBFAC,,, | Performed by: NURSE PRACTITIONER

## 2023-09-13 PROCEDURE — 99999 PR PBB SHADOW E&M-EST. PATIENT-LVL IV: ICD-10-PCS | Mod: PBBFAC,,, | Performed by: NURSE PRACTITIONER

## 2023-09-13 PROCEDURE — 99213 PR OFFICE/OUTPT VISIT, EST, LEVL III, 20-29 MIN: ICD-10-PCS | Mod: S$PBB,,, | Performed by: NURSE PRACTITIONER

## 2023-09-13 PROCEDURE — 99213 OFFICE O/P EST LOW 20 MIN: CPT | Mod: S$PBB,,, | Performed by: NURSE PRACTITIONER

## 2023-09-13 NOTE — PROGRESS NOTES
Heme/Onc Progress Note    PATIENT: Filomena Tam  MRN: [id number]  DATE: 9/13/2023  Chief Complaint: No chief complaint on file.          Oncology History   Diagnosis:     1) Right DCIS, (2010)--->    -DCIS Recurrence (6/14)  2) Atypical ductal hyperplasia  3) Intraductal papilloma (2010)    Present Treatment:   Observation    Treatment history:      Lumpectomy (9/21/10) --> XRT ([facility]) --> Tamoxifen (2010-6/2014)-->Recurrence while on Tamoxifen--> Lumpectomy--> + margins---> re excision-->whole breast XRT--> Femara-->Arimidex  Arimidex 1 mg daily on 02/02/2015  Prolia (initiated 10/2015)    Clinical History:  Mrs TAM, she is a  white female that back in April of 2009 she had a normal mammogram that showed a focal asymmetry within the retroareolar region of the left breast. She was called for additional views that demonstrated multiple circular masses which contain lucency within them associated with pseudocapsule. Suspected  hamartomatous changes (benign). There were concerned that dose nodules could be fibroadenoma versus phyllodes tumor. She had an ultrasound-guided, large core, vacuum assisted needle biopsy of the left breast nodules. Pathology report dated 4/13/09 showed benign breast parenchyma with cysts, dilated ducts, ductal chronic inflammation, apocrine metaplasia, stromal fibrosis. The second biopsy showed focal moderate to florid ductal hyperplasia without atypia. A 6 month follow up mammography and ultrasound of the left breast was suggested to assure stability of these findings. She had this radiologic studies performed in November of 09, mammogram showed  post biopsy changes on the left with otherwise no significant changes and ultrasound showed no masses or fluid collection. She had her mammogram in July of this year and the left breast showed benign appearing nodules and post biopsy change is. Right breast showed an enlarging nodule in the retroareolar area that may short 9 mm. It  was not associated with calcification and a true lateral view Spot compression views and ultrasound were recommended. The spot compression and detailed images demonstrated that the nodule where real and that the margins were lobulated and irregular. The nodule was suspicious for malignancy at that point. Ultrasound of the right breast performed 8/10/10 confirmed the suspicious nodule, 3 cm from the nipple, with greatest dimension of 9 mm. She had a stereotactic vacuum assisted biopsy of the right breast on 8/26/10 that showed atypical ductal hyperplasia. Microcalcifications were present and associated with benign parenchyma. Cystic duct with papillary apocrine metaplasia. No invasive carcinoma identified. She had mammography guided wire localization of the right breast and lumpectomy on 9/21/10. Pathology report came back as ductal carcinoma in situ, solitary focus, 2 mm, solid type. Intermediate nuclear grade, deep lateral margins is 0.1 mm from the neoplasm. Ductal hyperplasia with atypia, multiple foci, clear margins. Sclerosing adenosis. Tissue studies showed ER negative, AR 1%. She had a bilateral breast MRI performed 10/4/10 and these showed bilateral breast cysts. No direct evidence of malignancy at that time. She had re excision on 11/2/10 of the right breast tissue and left breast tissue excision does showed focal intraductal papilloma. And she was referred for further evaluation and treatment recommendations.    She was started on Tamoxifen despite ER negative, but she was progesterone positive, mainly because of her extensive ADH as chemoprevention. She had XRT after her [facility]pectomy [facility].      She has another breast imaging in June of this 2014 and it was abnormal. She has a right lumpectomy on 7/1/14 but margins were involved with intraductal carcinoma. Dr Cates went back again 7/18/14 and she had clear margins and no residual disease. This time she was ER/AR+. She has some  thickening of uterus. I reviewed results and  referred her back to her GYN for poss. biopsy. She has the biopsy and it was negative.  She was started on aromatase inhibitor after completion of her whole breast radiation.     Bone density on 9/18/17 with osteopenia.         Past Medical History:   Diagnosis Date    Atypical ductal hyperplasia, breast     Ductal carcinoma in situ (DCIS) of right breast     Osteopenia            Review of Systems   Constitutional:  Negative for appetite change and unexpected weight change.   HENT:  Negative for mouth sores.    Eyes:  Negative for visual disturbance.   Respiratory:  Negative for cough and shortness of breath.    Cardiovascular:  Negative for chest pain.   Gastrointestinal:  Negative for abdominal pain and diarrhea.   Genitourinary:  Negative for frequency.   Musculoskeletal:  Negative for back pain.   Integumentary:  Negative for rash.   Neurological:  Negative for headaches.   Hematological:  Negative for adenopathy.   Psychiatric/Behavioral:  The patient is not nervous/anxious.        Interval History, 9/13/23:  Patient presents today for 12 month f/u in surveillance for DCIS dx in 2010 and recurrence in 2014. Completed 5 years of endocrine therapy in Sep 2019.  She says that she feels well. Patient has no complaints today accept fatigue. She has been helping her daughter take care of her new 3 month old grand daughter .  Denies fever, chills and sweats. Denies nausea, vomiting, and diarrhea.     Mammogram done 12/2022 ordered by Dr. Cates, was benign.   Objective:     There were no vitals filed for this visit.        Physical Exam  Vitals reviewed.   Constitutional:       Appearance: Normal appearance.   HENT:      Head: Normocephalic and atraumatic.      Right Ear: External ear normal.      Left Ear: External ear normal.      Nose: Nose normal.      Mouth/Throat:      Mouth: Mucous membranes are moist.      Pharynx: Oropharynx is clear.   Eyes:      General: Lids  are normal. Lids are everted, no foreign bodies appreciated. Vision grossly intact. Gaze aligned appropriately.   Cardiovascular:      Rate and Rhythm: Normal rate and regular rhythm.      Pulses: Normal pulses.      Heart sounds: Normal heart sounds.   Pulmonary:      Effort: Pulmonary effort is normal. No accessory muscle usage or respiratory distress.      Breath sounds: Normal breath sounds. No decreased breath sounds, wheezing, rhonchi or rales.   Chest:   Breasts:     Right: No inverted nipple, mass, nipple discharge or tenderness.      Left: Normal. No inverted nipple, mass, nipple discharge or tenderness.          Comments: RIGHT BREAST LUMPECTOMY. Large firm mass below incision that is thought to be seroma  Abdominal:      General: Bowel sounds are normal.      Palpations: Abdomen is soft.      Tenderness: There is no abdominal tenderness.   Musculoskeletal:         General: Normal range of motion.      Cervical back: Normal range of motion and neck supple.      Right lower leg: No edema.      Left lower leg: No edema.   Feet:      Right foot:      Skin integrity: Skin integrity normal.      Left foot:      Skin integrity: Skin integrity normal.   Lymphadenopathy:      Cervical: No cervical adenopathy.      Upper Body:      Right upper body: No supraclavicular, axillary or pectoral adenopathy.      Left upper body: No supraclavicular, axillary or pectoral adenopathy.      Lower Body: No right inguinal adenopathy. No left inguinal adenopathy.   Skin:     General: Skin is warm and dry.      Capillary Refill: Capillary refill takes less than 2 seconds.      Findings: No rash.      Nails: There is no clubbing.   Neurological:      General: No focal deficit present.      Mental Status: She is alert and oriented to person, place, and time. Mental status is at baseline.      Deep Tendon Reflexes: Reflexes normal.   Psychiatric:         Attention and Perception: Attention and perception normal.         Mood and  Affect: Mood normal.         Behavior: Behavior normal.       Assessment:     1) DCIS 2010: Patient with h/o of DCIS of her right breast, diagnosed in 2010. She has lumpectomy followed by mammo site and then she was initiated on endocrine therapy with Tamoxifen.     -Recurrence 2014: She developed DCIS on the ipsilateral breast while on Tamoxifen in 2014. She underwent lumpectomy and later on, Re excision due to positive margins. She completed whole breast radiation with Dr Iniguez.  ER was positive so she was started on Femara but she did not tolerate well, therefore, she was switched to Arimidex and tolerates it fairly well.   Completed Arimidex for a total of 5 years- Sept 2019.  BREAST ANOMALIES OT-ADH  Osteopenia--Last received Prolia on 12/3/18.  Bone density 9/19/2019 with osteopenia    ER positive    Problem List Items Addressed This Visit    None          Plan:        No clinical evidence of recurrence at this time.   We will start ordering her mammograms as she was released from Dr. Cates' care in 1/2023.  Next mammogram is due in 12/2023- ordered.  Will see her back in 3 months after the mammogram then annually if all is well. No labs- PCP orders q6 months.     LEONOR Alcocer

## 2023-12-26 ENCOUNTER — PATIENT MESSAGE (OUTPATIENT)
Dept: HEMATOLOGY/ONCOLOGY | Facility: CLINIC | Age: 73
End: 2023-12-26
Payer: MEDICARE

## 2024-01-03 ENCOUNTER — HOSPITAL ENCOUNTER (OUTPATIENT)
Dept: RADIOLOGY | Facility: HOSPITAL | Age: 74
Discharge: HOME | End: 2024-01-03
Attending: NURSE PRACTITIONER
Payer: MEDICARE

## 2024-01-03 DIAGNOSIS — N60.99 ATYPICAL DUCTAL HYPERPLASIA OF BREAST: ICD-10-CM

## 2024-01-03 DIAGNOSIS — Z13.9 ENCOUNTER FOR SCREENING: ICD-10-CM

## 2024-01-03 DIAGNOSIS — Z12.31 ENCOUNTER FOR SCREENING MAMMOGRAM FOR MALIGNANT NEOPLASM OF BREAST: ICD-10-CM

## 2024-01-03 DIAGNOSIS — D05.11 INTRADUCTAL CARCINOMA IN SITU OF RIGHT BREAST: ICD-10-CM

## 2024-01-03 PROCEDURE — 77067 SCR MAMMO BI INCL CAD: CPT | Mod: TC

## 2024-01-03 PROCEDURE — 77067 SCR MAMMO BI INCL CAD: CPT | Mod: 26,,, | Performed by: STUDENT IN AN ORGANIZED HEALTH CARE EDUCATION/TRAINING PROGRAM

## 2024-01-03 PROCEDURE — 77063 BREAST TOMOSYNTHESIS BI: CPT | Mod: 26,,, | Performed by: STUDENT IN AN ORGANIZED HEALTH CARE EDUCATION/TRAINING PROGRAM

## 2024-01-17 ENCOUNTER — OFFICE VISIT (OUTPATIENT)
Dept: HEMATOLOGY/ONCOLOGY | Facility: CLINIC | Age: 74
End: 2024-01-17
Payer: MEDICARE

## 2024-01-17 VITALS
SYSTOLIC BLOOD PRESSURE: 151 MMHG | RESPIRATION RATE: 17 BRPM | WEIGHT: 165.31 LBS | HEART RATE: 68 BPM | OXYGEN SATURATION: 98 % | DIASTOLIC BLOOD PRESSURE: 82 MMHG | BODY MASS INDEX: 28.37 KG/M2 | TEMPERATURE: 98 F

## 2024-01-17 DIAGNOSIS — D05.11 INTRADUCTAL CARCINOMA IN SITU OF RIGHT BREAST: Primary | ICD-10-CM

## 2024-01-17 DIAGNOSIS — Z12.31 ENCOUNTER FOR SCREENING MAMMOGRAM FOR MALIGNANT NEOPLASM OF BREAST: ICD-10-CM

## 2024-01-17 DIAGNOSIS — N60.99 ATYPICAL DUCTAL HYPERPLASIA OF BREAST: ICD-10-CM

## 2024-01-17 PROCEDURE — 99999 PR PBB SHADOW E&M-EST. PATIENT-LVL III: CPT | Mod: PBBFAC,,, | Performed by: NURSE PRACTITIONER

## 2024-01-17 PROCEDURE — 99213 OFFICE O/P EST LOW 20 MIN: CPT | Mod: PBBFAC | Performed by: NURSE PRACTITIONER

## 2024-01-17 PROCEDURE — 99214 OFFICE O/P EST MOD 30 MIN: CPT | Mod: S$PBB,,, | Performed by: NURSE PRACTITIONER

## 2024-01-17 RX ORDER — FLUTICASONE PROPIONATE 50 MCG
2 SPRAY, SUSPENSION (ML) NASAL
COMMUNITY
Start: 2024-01-15

## 2024-01-17 RX ORDER — PREDNISONE 20 MG/1
20 TABLET ORAL
COMMUNITY
Start: 2024-01-14

## 2024-01-17 NOTE — PROGRESS NOTES
Heme/Onc Progress Note    PATIENT: Filomena Tam  MRN: [id number]  DATE: 1/17/2024  Chief Complaint: Results (No concerns today.)          Oncology History   Diagnosis:     1) Right DCIS, (2010)--->    -DCIS Recurrence (6/14)  2) Atypical ductal hyperplasia  3) Intraductal papilloma (2010)    Present Treatment:   Observation    Treatment history:      Lumpectomy (9/21/10) --> XRT (Mammosite) --> Tamoxifen (2010-6/2014)-->Recurrence while on Tamoxifen--> Lumpectomy--> + margins---> re excision-->whole breast XRT--> Femara-->Arimidex  Arimidex 1 mg daily on 02/02/2015  Prolia (initiated 10/2015)    Clinical History:  Mrs TAM, she is a  white female that back in April of 2009 she had a normal mammogram that showed a focal asymmetry within the retroareolar region of the left breast. She was called for additional views that demonstrated multiple circular masses which contain lucency within them associated with pseudocapsule. Suspected  hamartomatous changes (benign). There were concerned that dose nodules could be fibroadenoma versus phyllodes tumor. She had an ultrasound-guided, large core, vacuum assisted needle biopsy of the left breast nodules. Pathology report dated 4/13/09 showed benign breast parenchyma with cysts, dilated ducts, ductal chronic inflammation, apocrine metaplasia, stromal fibrosis. The second biopsy showed focal moderate to florid ductal hyperplasia without atypia. A 6 month follow up mammography and ultrasound of the left breast was suggested to assure stability of these findings. She had this radiologic studies performed in November of 09, mammogram showed  post biopsy changes on the left with otherwise no significant changes and ultrasound showed no masses or fluid collection. She had her mammogram in July of this year and the left breast showed benign appearing nodules and post biopsy change is. Right breast showed an enlarging nodule in the retroareolar area that may short 9 mm. It  was not associated with calcification and a true lateral view Spot compression views and ultrasound were recommended. The spot compression and detailed images demonstrated that the nodule where real and that the margins were lobulated and irregular. The nodule was suspicious for malignancy at that point. Ultrasound of the right breast performed 8/10/10 confirmed the suspicious nodule, 3 cm from the nipple, with greatest dimension of 9 mm. She had a stereotactic vacuum assisted biopsy of the right breast on 8/26/10 that showed atypical ductal hyperplasia. Microcalcifications were present and associated with benign parenchyma. Cystic duct with papillary apocrine metaplasia. No invasive carcinoma identified. She had mammography guided wire localization of the right breast and lumpectomy on 9/21/10. Pathology report came back as ductal carcinoma in situ, solitary focus, 2 mm, solid type. Intermediate nuclear grade, deep lateral margins is 0.1 mm from the neoplasm. Ductal hyperplasia with atypia, multiple foci, clear margins. Sclerosing adenosis. Tissue studies showed ER negative, MI 1%. She had a bilateral breast MRI performed 10/4/10 and these showed bilateral breast cysts. No direct evidence of malignancy at that time. She had re excision on 11/2/10 of the right breast tissue and left breast tissue excision does showed focal intraductal papilloma. And she was referred for further evaluation and treatment recommendations.    She was started on Tamoxifen despite ER negative, but she was progesterone positive, mainly because of her extensive ADH as chemoprevention. She had XRT after her lumpectomy in [facility], [facility].      She has another breast imaging in June of this 2014 and it was abnormal. She has a right lumpectomy on 7/1/14 but margins were involved with intraductal carcinoma. Dr Cates went back again 7/18/14 and she had clear margins and no residual disease. This time she was ER/MI+. She has some  thickening of uterus. I reviewed results and  referred her back to her GYN for poss. biopsy. She has the biopsy and it was negative.  She was started on aromatase inhibitor after completion of her whole breast radiation.     Bone density on 9/18/17 with osteopenia.           Past Medical History:   Diagnosis Date    Atypical ductal hyperplasia, breast     Ductal carcinoma in situ (DCIS) of right breast     Osteopenia            Review of Systems   Constitutional:  Negative for appetite change and unexpected weight change.   HENT:  Negative for mouth sores.    Eyes:  Negative for visual disturbance.   Respiratory:  Negative for cough and shortness of breath.    Cardiovascular:  Negative for chest pain.   Gastrointestinal:  Negative for abdominal pain and diarrhea.   Genitourinary:  Negative for frequency.   Musculoskeletal:  Negative for back pain.   Integumentary:  Negative for rash.   Neurological:  Negative for headaches.   Hematological:  Negative for adenopathy.   Psychiatric/Behavioral:  The patient is not nervous/anxious.        Interval History, 1/17/24:  Patient presents today for 3 month f/u in surveillance for DCIS dx in 2010 and recurrence in 2014. Completed 5 years of endocrine therapy in Sep 2019.  This is a short interval f/u because she was released form breast surgeon so we are taking over the ordering of her mammograms. She says that she feels well. Patient has no complaints today accept fatigue. She has been helping her daughter take care of her new 3 month old grand daughter .  Denies fever, chills and sweats. Denies nausea, vomiting, and diarrhea.     Mammogram done 12/2022 ordered by Dr. Cates, was benign.     Mammogram screening done on 1/3/24:  IMPRESSION: BENIGN  There is no mammographic evidence of malignancy.         RECOMMENDATIONS:   A routine screening mammogram in one year in the absence of significant clinical findings in the interval is recommended (January 2025).    Objective:      Vitals:    01/17/24 1307   BP: (!) 151/82   Pulse: 68   Resp: 17   Temp: 97.6 °F (36.4 °C)           Physical Exam  Vitals reviewed.   Constitutional:       Appearance: Normal appearance.   HENT:      Head: Normocephalic and atraumatic.      Right Ear: External ear normal.      Left Ear: External ear normal.      Nose: Nose normal.      Mouth/Throat:      Mouth: Mucous membranes are moist.      Pharynx: Oropharynx is clear.   Eyes:      General: Lids are normal. Lids are everted, no foreign bodies appreciated. Vision grossly intact. Gaze aligned appropriately.   Cardiovascular:      Rate and Rhythm: Normal rate and regular rhythm.      Pulses: Normal pulses.      Heart sounds: Normal heart sounds.   Pulmonary:      Effort: Pulmonary effort is normal. No accessory muscle usage or respiratory distress.      Breath sounds: Normal breath sounds. No decreased breath sounds, wheezing, rhonchi or rales.   Chest:   Breasts:     Right: No inverted nipple, mass, nipple discharge or tenderness.      Left: Normal. No inverted nipple, mass, nipple discharge or tenderness.          Comments: RIGHT BREAST LUMPECTOMY. Large firm mass below incision that is thought to be seroma  Abdominal:      General: Bowel sounds are normal.      Palpations: Abdomen is soft.      Tenderness: There is no abdominal tenderness.   Musculoskeletal:         General: Normal range of motion.      Cervical back: Normal range of motion and neck supple.      Right lower leg: No edema.      Left lower leg: No edema.   Feet:      Right foot:      Skin integrity: Skin integrity normal.      Left foot:      Skin integrity: Skin integrity normal.   Lymphadenopathy:      Cervical: No cervical adenopathy.      Upper Body:      Right upper body: No supraclavicular, axillary or pectoral adenopathy.      Left upper body: No supraclavicular, axillary or pectoral adenopathy.      Lower Body: No right inguinal adenopathy. No left inguinal adenopathy.   Skin:      General: Skin is warm and dry.      Capillary Refill: Capillary refill takes less than 2 seconds.      Findings: No rash.      Nails: There is no clubbing.   Neurological:      General: No focal deficit present.      Mental Status: She is alert and oriented to person, place, and time. Mental status is at baseline.      Deep Tendon Reflexes: Reflexes normal.   Psychiatric:         Attention and Perception: Attention and perception normal.         Mood and Affect: Mood normal.         Behavior: Behavior normal.         Assessment:     1) DCIS 2010: Patient with h/o of DCIS of her right breast, diagnosed in 2010. She has lumpectomy followed by mammo site and then she was initiated on endocrine therapy with Tamoxifen.     -Recurrence 2014: She developed DCIS on the ipsilateral breast while on Tamoxifen in 2014. She underwent lumpectomy and later on, Re excision due to positive margins. She completed whole breast radiation with Dr Iniguez.  ER was positive so she was started on Femara but she did not tolerate well, therefore, she was switched to Arimidex and tolerates it fairly well.   Completed Arimidex for a total of 5 years- Sept 2019.  BREAST ANOMALIES OT-ADH  Osteopenia--Last received Prolia on 12/3/18.  Bone density 9/19/2019 with osteopenia    ER positive    Problem List Items Addressed This Visit          Oncology    Intraductal carcinoma in situ of right breast - Primary    Atypical ductal hyperplasia of breast     Other Visit Diagnoses       Encounter for screening mammogram for malignant neoplasm of breast        Relevant Orders    Mammo Digital Screening Bilat w/ David                Plan:        No clinical evidence of recurrence at this time.   Next mammogram is due in 1/2024- ordered.  Will see her back 12 months after the mammogram. No labs- PCP orders q6 months.     LEONOR Alcocer

## 2025-01-06 ENCOUNTER — HOSPITAL ENCOUNTER (OUTPATIENT)
Dept: RADIOLOGY | Facility: HOSPITAL | Age: 75
Discharge: HOME | End: 2025-01-06
Attending: NURSE PRACTITIONER
Payer: MEDICARE

## 2025-01-06 DIAGNOSIS — Z12.31 ENCOUNTER FOR SCREENING MAMMOGRAM FOR MALIGNANT NEOPLASM OF BREAST: ICD-10-CM

## 2025-01-06 PROCEDURE — 77063 BREAST TOMOSYNTHESIS BI: CPT | Mod: 26,,, | Performed by: RADIOLOGY

## 2025-01-06 PROCEDURE — 77067 SCR MAMMO BI INCL CAD: CPT | Mod: TC

## 2025-01-06 PROCEDURE — 77067 SCR MAMMO BI INCL CAD: CPT | Mod: 26,,, | Performed by: RADIOLOGY

## 2025-02-11 NOTE — PROGRESS NOTES
Heme/Onc Progress Note    PATIENT: Filomena Tam  MRN: [id number]  DATE: 2/17/2025  Chief Complaint: Results (Mammo Digital Results.)          Oncology History   Diagnosis:     1) Right DCIS, (2010)--->    -DCIS Recurrence (6/14)  2) Atypical ductal hyperplasia  3) Intraductal papilloma (2010)    Present Treatment:   Observation    Treatment history:      Lumpectomy (9/21/10) --> XRT (Mammosite) --> Tamoxifen (2010-6/2014)-->Recurrence while on Tamoxifen--> Lumpectomy--> + margins---> re excision-->whole breast XRT--> Femara-->Arimidex  Arimidex 1 mg daily on 02/02/2015  Prolia (initiated 10/2015)    Clinical History:  Mrs TAM, she is a  white female that back in April of 2009 she had a normal mammogram that showed a focal asymmetry within the retroareolar region of the left breast. She was called for additional views that demonstrated multiple circular masses which contain lucency within them associated with pseudocapsule. Suspected  hamartomatous changes (benign). There were concerned that dose nodules could be fibroadenoma versus phyllodes tumor. She had an ultrasound-guided, large core, vacuum assisted needle biopsy of the left breast nodules. Pathology report dated 4/13/09 showed benign breast parenchyma with cysts, dilated ducts, ductal chronic inflammation, apocrine metaplasia, stromal fibrosis. The second biopsy showed focal moderate to florid ductal hyperplasia without atypia. A 6 month follow up mammography and ultrasound of the left breast was suggested to assure stability of these findings. She had this radiologic studies performed in November of 09, mammogram showed  post biopsy changes on the left with otherwise no significant changes and ultrasound showed no masses or fluid collection. She had her mammogram in July of this year and the left breast showed benign appearing nodules and post biopsy change is. Right breast showed an enlarging nodule in the retroareolar area that may short 9 mm.  It was not associated with calcification and a true lateral view Spot compression views and ultrasound were recommended. The spot compression and detailed images demonstrated that the nodule where real and that the margins were lobulated and irregular. The nodule was suspicious for malignancy at that point. Ultrasound of the right breast performed 8/10/10 confirmed the suspicious nodule, 3 cm from the nipple, with greatest dimension of 9 mm. She had a stereotactic vacuum assisted biopsy of the right breast on 8/26/10 that showed atypical ductal hyperplasia. Microcalcifications were present and associated with benign parenchyma. Cystic duct with papillary apocrine metaplasia. No invasive carcinoma identified. She had mammography guided wire localization of the right breast and lumpectomy on 9/21/10. Pathology report came back as ductal carcinoma in situ, solitary focus, 2 mm, solid type. Intermediate nuclear grade, deep lateral margins is 0.1 mm from the neoplasm. Ductal hyperplasia with atypia, multiple foci, clear margins. Sclerosing adenosis. Tissue studies showed ER negative, ND 1%. She had a bilateral breast MRI performed 10/4/10 and these showed bilateral breast cysts. No direct evidence of malignancy at that time. She had re excision on 11/2/10 of the right breast tissue and left breast tissue excision does showed focal intraductal papilloma. And she was referred for further evaluation and treatment recommendations.    She was started on Tamoxifen despite ER negative, but she was progesterone positive, mainly because of her extensive ADH as chemoprevention. She had XRT after her lumpectomy in [facility], Mamm[facility].      She has another breast imaging in June of this 2014 and it was abnormal. She has a right lumpectomy on 7/1/14 but margins were involved with intraductal carcinoma. Dr Cates went back again 7/18/14 and she had clear margins and no residual disease. This time she was ER/ND+. She has some  thickening of uterus. I reviewed results and  referred her back to her GYN for poss. biopsy. She has the biopsy and it was negative.  She was started on aromatase inhibitor after completion of her whole breast radiation.     Bone density on 9/18/17 with osteopenia.           Past Medical History:   Diagnosis Date    Atypical ductal hyperplasia, breast     Ductal carcinoma in situ (DCIS) of right breast     Osteopenia            Review of Systems   Constitutional:  Negative for appetite change and unexpected weight change.   HENT:  Negative for mouth sores.    Eyes:  Negative for visual disturbance.   Respiratory:  Negative for cough and shortness of breath.    Cardiovascular:  Negative for chest pain.   Gastrointestinal:  Negative for abdominal pain and diarrhea.   Genitourinary:  Negative for frequency.   Musculoskeletal:  Negative for back pain.   Integumentary:  Negative for rash.   Neurological:  Negative for headaches.   Hematological:  Negative for adenopathy.   Psychiatric/Behavioral:  The patient is not nervous/anxious.      Interval History    1/17/24:Patient presents today for 3 month f/u in surveillance for DCIS dx in 2010 and recurrence in 2014. Completed 5 years of endocrine therapy in Sep 2019.  This is a short interval f/u because she was released form breast surgeon so we are taking over the ordering of her mammograms. She says that she feels well. Patient has no complaints today accept fatigue. She has been helping her daughter take care of her new 3 month old grand daughter .  Denies fever, chills and sweats. Denies nausea, vomiting, and diarrhea. Mammogram done 12/2022 ordered by Dr. Cates, was benign.   Mammogram screening done on 1/3/24: IMPRESSION: BENIGN There is no mammographic evidence of malignancy. RECOMMENDATIONS: A routine screening mammogram in one year in the absence of significant clinical findings in the interval is recommended (January 2025).      02/17/2025: Patient presents  today for annual follow up and mammogram results. Mammogram done 01/06/2025: IMPRESSION: BENIGN There is no mammographic evidence of malignancy. RECOMMENDATIONS:  A routine screening mammogram in one year in the absence of significant clinical findings in the interval is recommended (January 2026).  Denies fever, chills and sweats. Denies nausea, vomiting, and diarrhea. Overall, she says she is feeling good. Discussed MMG/MRI breast alternating every 6 month, pt will think about it and call office if she decides to schedule MRI. Patient received pint on blood due to iron deficiency, needs to have a follow up colonoscopy, refer to GI.     Objective:     Vitals:    02/17/25 1043   BP: 139/80   Pulse: 72   Resp: 18   Temp: 97.7 °F (36.5 °C)             Physical Exam  Vitals reviewed.   Constitutional:       Appearance: Normal appearance.   HENT:      Head: Normocephalic and atraumatic.      Right Ear: External ear normal.      Left Ear: External ear normal.      Nose: Nose normal.      Mouth/Throat:      Mouth: Mucous membranes are moist.      Pharynx: Oropharynx is clear.   Eyes:      General: Lids are normal. Lids are everted, no foreign bodies appreciated. Vision grossly intact. Gaze aligned appropriately.   Cardiovascular:      Rate and Rhythm: Normal rate and regular rhythm.      Pulses: Normal pulses.      Heart sounds: Normal heart sounds.   Pulmonary:      Effort: Pulmonary effort is normal. No accessory muscle usage or respiratory distress.      Breath sounds: Normal breath sounds. No decreased breath sounds, wheezing, rhonchi or rales.   Chest:   Breasts:     Right: No inverted nipple, mass, nipple discharge or tenderness.      Left: Normal. No inverted nipple, mass, nipple discharge or tenderness.      Comments: RIGHT BREAST LUMPECTOMY. Large firm mass below incision that is thought to be seroma  Abdominal:      General: Bowel sounds are normal.      Palpations: Abdomen is soft.      Tenderness: There is no  abdominal tenderness.   Musculoskeletal:         General: Normal range of motion.      Cervical back: Normal range of motion and neck supple.      Right lower leg: No edema.      Left lower leg: No edema.   Feet:      Right foot:      Skin integrity: Skin integrity normal.      Left foot:      Skin integrity: Skin integrity normal.   Lymphadenopathy:      Cervical: No cervical adenopathy.      Upper Body:      Right upper body: No supraclavicular, axillary or pectoral adenopathy.      Left upper body: No supraclavicular, axillary or pectoral adenopathy.      Lower Body: No right inguinal adenopathy. No left inguinal adenopathy.   Skin:     General: Skin is warm and dry.      Capillary Refill: Capillary refill takes less than 2 seconds.      Findings: No rash.      Nails: There is no clubbing.   Neurological:      General: No focal deficit present.      Mental Status: She is alert and oriented to person, place, and time. Mental status is at baseline.      Deep Tendon Reflexes: Reflexes normal.   Psychiatric:         Attention and Perception: Attention and perception normal.         Mood and Affect: Mood normal.         Behavior: Behavior normal.         Assessment:       1. Intraductal carcinoma in situ of right breast    2. S/P lumpectomy, right breast    3. History of radiation therapy    4. Atypical ductal hyperplasia of breast    5. Screening mammogram for breast cancer    6. Encounter for screening colonoscopy        1) DCIS 2010: Patient with h/o of DCIS of her right breast, diagnosed in 2010. She has lumpectomy followed by mammo site and then she was initiated on endocrine therapy with Tamoxifen.     -Recurrence 2014: She developed DCIS on the ipsilateral breast while on Tamoxifen in 2014. She underwent lumpectomy and later on, Re excision due to positive margins. She completed whole breast radiation with Dr Iniguez.  ER was positive so she was started on Femara but she did not tolerate well, therefore, she  was switched to Arimidex and tolerates it fairly well.   Completed Arimidex for a total of 5 years- Sept 2019.  BREAST ANOMALIES OT-ADH  Osteopenia--Last received Prolia on 12/3/18.  Bone density 9/19/2019 with osteopenia    ER positive          Plan:         No clinical evidence of recurrence at this time.   Mammogram due 1/2026 - ordered.  Referral to GI - colonoscopy  RTC in 3 months MD/labs 3 to 5 days prior  No labs- PCP orders q6 months.   The patient was seen, interviewed and examined. Pertinent lab and radiology studies were reviewed.   The patient was given ample opportunity to ask questions, and to the best of my abilities, all questions answered to satisfaction; patient demonstrated understanding of what we discussed and agreeable to the plan. Pt instructed to call should develop concerning signs/symptoms or have further questions.   Visit today included increased complexity associated with the care of the episodic problem DCIS, addressing and managing the longitudinal care of the patient's DCIS.       Marilou Hussein MD  Hematology/Oncology  [facility]      Professional Services   I, Doreen Starks LPN, acted solely as a scribe for and in the presence of Dr. Marilou Hussein, who performed these services.

## 2025-02-17 ENCOUNTER — OFFICE VISIT (OUTPATIENT)
Dept: HEMATOLOGY/ONCOLOGY | Facility: CLINIC | Age: 75
End: 2025-02-17
Payer: MEDICARE

## 2025-02-17 VITALS
RESPIRATION RATE: 18 BRPM | DIASTOLIC BLOOD PRESSURE: 80 MMHG | HEIGHT: 64 IN | WEIGHT: 163.81 LBS | TEMPERATURE: 98 F | OXYGEN SATURATION: 96 % | BODY MASS INDEX: 27.96 KG/M2 | HEART RATE: 72 BPM | SYSTOLIC BLOOD PRESSURE: 139 MMHG

## 2025-02-17 DIAGNOSIS — N60.99 ATYPICAL DUCTAL HYPERPLASIA OF BREAST: ICD-10-CM

## 2025-02-17 DIAGNOSIS — Z12.11 ENCOUNTER FOR SCREENING COLONOSCOPY: ICD-10-CM

## 2025-02-17 DIAGNOSIS — D05.11 INTRADUCTAL CARCINOMA IN SITU OF RIGHT BREAST: Primary | ICD-10-CM

## 2025-02-17 DIAGNOSIS — Z12.31 SCREENING MAMMOGRAM FOR BREAST CANCER: ICD-10-CM

## 2025-02-17 DIAGNOSIS — Z98.890 S/P LUMPECTOMY, RIGHT BREAST: ICD-10-CM

## 2025-02-17 DIAGNOSIS — Z92.3 HISTORY OF RADIATION THERAPY: ICD-10-CM

## 2025-02-17 PROCEDURE — 99215 OFFICE O/P EST HI 40 MIN: CPT | Mod: PBBFAC | Performed by: INTERNAL MEDICINE

## 2025-02-17 PROCEDURE — 99214 OFFICE O/P EST MOD 30 MIN: CPT | Mod: S$PBB,,, | Performed by: INTERNAL MEDICINE
